# Patient Record
Sex: FEMALE | HISPANIC OR LATINO | Employment: FULL TIME | URBAN - METROPOLITAN AREA
[De-identification: names, ages, dates, MRNs, and addresses within clinical notes are randomized per-mention and may not be internally consistent; named-entity substitution may affect disease eponyms.]

---

## 2017-04-03 ENCOUNTER — TRANSFERRED RECORDS (OUTPATIENT)
Dept: HEALTH INFORMATION MANAGEMENT | Facility: CLINIC | Age: 32
End: 2017-04-03

## 2017-04-14 ENCOUNTER — TRANSFERRED RECORDS (OUTPATIENT)
Dept: HEALTH INFORMATION MANAGEMENT | Facility: CLINIC | Age: 32
End: 2017-04-14

## 2017-04-15 ENCOUNTER — MEDICAL CORRESPONDENCE (OUTPATIENT)
Dept: HEALTH INFORMATION MANAGEMENT | Facility: CLINIC | Age: 32
End: 2017-04-15

## 2017-04-17 ENCOUNTER — MEDICAL CORRESPONDENCE (OUTPATIENT)
Dept: HEALTH INFORMATION MANAGEMENT | Facility: CLINIC | Age: 32
End: 2017-04-17

## 2017-04-20 ENCOUNTER — OFFICE VISIT (OUTPATIENT)
Dept: NEUROLOGY | Facility: CLINIC | Age: 32
End: 2017-04-20

## 2017-04-20 ENCOUNTER — ALLIED HEALTH/NURSE VISIT (OUTPATIENT)
Dept: NEUROLOGY | Facility: CLINIC | Age: 32
End: 2017-04-20

## 2017-04-20 ENCOUNTER — TELEPHONE (OUTPATIENT)
Dept: NEUROLOGY | Facility: CLINIC | Age: 32
End: 2017-04-20

## 2017-04-20 VITALS
BODY MASS INDEX: 21.18 KG/M2 | DIASTOLIC BLOOD PRESSURE: 101 MMHG | SYSTOLIC BLOOD PRESSURE: 164 MMHG | HEIGHT: 66 IN | WEIGHT: 131.8 LBS | HEART RATE: 86 BPM

## 2017-04-20 RX ORDER — NORTRIPTYLINE HCL 10 MG
10 CAPSULE ORAL AT BEDTIME
Qty: 30 CAPSULE | Refills: 3 | Status: SHIPPED | OUTPATIENT
Start: 2017-04-20 | End: 2022-06-13

## 2017-04-20 NOTE — PROGRESS NOTES
[facility] [address]355-18  OP/3hr Video EEG  [facility]  Dr. Moreno reading

## 2017-04-20 NOTE — TELEPHONE ENCOUNTER
Caller: pinky   Relationship to Patient: self   Call Back Number: [phone]   Reason for Call: patient would like to know if she can have caffeine. Patient took herself off of caffeine a week ago       She discontinued caffeine one week ago as she didn't feel well and had a headache.  Pinky calls today to see if it's okay to continue drinking caffeine  Advised Pinky that it would be best to omit caffeine altogether. If she is unable to omit caffeine, would be okay to drink one cup of coffee/day.  Pinky verbalized agreement.

## 2017-04-20 NOTE — LETTER
Date:April 26, 2017      Patient was self referred, no letter generated. Do not send.        [facility] Health Information

## 2017-04-20 NOTE — MR AVS SNAPSHOT
After Visit Summary   4/20/2017    Irasema Kahn    MRN: [id number]           Patient Information     Date Of Birth          1985        Visit Information        Provider Department      4/20/2017 11:30 AM [facility] EEG 1 [facility]        Today's Diagnoses     Spells           Follow-ups after your visit        Your next 10 appointments already scheduled     Apr 25, 2017  1:00 PM CDT   MR BRAIN W/O CONTRAST with UUMR2   [facility], [place], [facility] ([facility], [facility])    [address] [address] [phone]           Take your medicines as usual, unless your doctor tells you not to. Bring a list of your current medicines to your exam (including vitamins, minerals and over-the-counter drugs). Also bring the results of similar scans you may have had.  Please remove any body piercings and hair extensions before you arrive.  Follow your doctor s orders. If you do not, we may have to postpone your exam.  You will not have contrast for this exam. You do not need to do anything special to prepare.  The MRI machine uses a strong magnet. Please wear clothes without metal (snaps, zippers). A sweatsuit works well, or we may give you a hospital gown.   **IMPORTANT** THE INSTRUCTIONS BELOW ARE ONLY FOR THOSE PATIENTS WHO HAVE BEEN TOLD THEY WILL RECEIVE SEDATION OR GENERAL ANESTHESIA DURING THEIR MRI PROCEDURE:  IF YOU WILL RECEIVE SEDATION (take medicine to help you relax during your exam):   You must get the medicine from your doctor before you arrive. Bring the medicine to the exam. Do not take it at home.   Arrive one hour early. Bring someone who can take you home after the test. Your medicine will make you sleepy. After the exam, you may not drive, take a bus or take a taxi by yourself.   No eating 8 hours before your exam. You may have clear liquids up until 4 hours before your exam. (Clear liquids include water, clear tea, black  coffee and fruit juice without pulp.)  IF YOU WILL RECEIVE ANESTHESIA (be asleep for your exam):   Arrive 1 1/2 hours early. Bring someone who can take you home after the test. You may not drive, take a bus or take a taxi by yourself.   No eating 8 hours before your exam. You may have clear liquids up until 4 hours before your exam. (Clear liquids include water, clear tea, black coffee and fruit juice without pulp.)   You will spend four to five hours in the recovery room.  Please call the Imaging Department at your exam site with any questions.            May 30, 2017  3:30 PM CDT   Return Visit with MD NIEVES [facility] Epile[facility] ([facility])    [address], [address]  [facility] [address] [phone]              Future tests that were ordered for you today     Open Future Orders        Priority Expected Expires Ordered    MR Brain w/o Contrast Routine  4/20/2018 4/20/2017            Who to contact     Please call your clinic at [phone] to:    Ask questions about your health    Make or cancel appointments    Discuss your medicines    Learn about your test results    Speak to your doctor   If you have compliments or concerns about an experience at your clinic, or if you wish to file a complaint, please contact [facility] Patient Relations at [phone] or email us at [email or web address]         Additional Information About Your Visit        CorporaharMoxsie Information     Honeyt is an electronic gateway that provides easy, online access to your medical records. With Certain, you can request a clinic appointment, read your test results, renew a prescription or communicate with your care team.     To sign up for Honeyt visit the website at [email or web address]   You will be asked to enter the access code listed below, as well as some personal information. Please follow the directions to create your username and password.      Your access code is: [email or web address]  Expires: 2017 10:30 AM     Your access code will  in 90 days. If you need help or a new code, please contact your [facility] or call [phone] for assistance.        Care EveryWhere ID     This is your Care EveryWhere ID. This could be used by other organizations to access your [place] medical records  [id number]         Blood Pressure from Last 3 Encounters:   17 (!) 164/101   16 136/82    Weight from Last 3 Encounters:   17 131 lb 12.8 oz (59.8 kg)   16 128 lb (58.1 kg)              We Performed the Following     CHARGE: Video EEG  < 12 hours (45320-59)     ORDER:  EEG video monitoring          Today's Medication Changes          These changes are accurate as of: 17  2:47 PM.  If you have any questions, ask your nurse or doctor.               Start taking these medicines.        Dose/Directions    nortriptyline 10 MG capsule   Commonly known as:  PAMELOR   Used for:  Spells   Started by:  Guera Alvarez MD        Dose:  10 mg   Take 1 capsule (10 mg) by mouth At Bedtime Take 1 tab at bedtime   Quantity:  30 capsule   Refills:  3            Where to get your medicines      These medications were sent to [address] [address]     Phone:  [phone]     nortriptyline 10 MG capsule                Primary Care Provider    Physician No Ref-Primary       No address on file        Thank you!     Thank you for choosing [facility] EPILEPSY [facility]  for your care. Our goal is always to provide you with excellent care. Hearing back from our patients is one way we can continue to improve our services. Please take a few minutes to complete the written survey that you may receive in the mail after your visit with us. Thank you!             Your Updated Medication List - Protect others around you: Learn how to safely use, store and  throw away your medicines at www.disposemymeds.org.          This list is accurate as of: 4/20/17  2:47 PM.  Always use your most recent med list.                   Brand Name Dispense Instructions for use    METFORMIN HCL PO      Take 850 mg by mouth daily (with breakfast)       nortriptyline 10 MG capsule    PAMELOR    30 capsule    Take 1 capsule (10 mg) by mouth At Bedtime Take 1 tab at bedtime       UNKNOWN TO PATIENT      Take 1 tablet by mouth daily Birth control

## 2017-04-20 NOTE — LETTER
2017       RE: Irasema Kahn  : 1985   MRN: [id number]      Dear Colleague,    Thank you for referring your patient, Irasema Kahn, to the [facility] EPILEPSY CARE at [facility]. Please see a copy of my visit note below.    INTRODUCTION:  The patient is a 31-year-old, right-handed female who was referred by YUE Pascal CNP, from [facility] for evaluation of visual disturbance and headache.  The patient has a history of seizure disorder.      HISTORY OF PRESENT ILLNESS:  This past Wednesday, , the patient had an episode with seeing spots in her visual fields and a severe headache, which lasted for about 3 days.  The pain was on the top of her head, and also she had significant neck muscle stiffness.  Her headache felt like pressure inside.  The episode of seeing spots lasted for about an hour, but the headache continued for approximately 3-4 days. When she went to [place], she got ibuprofen and Cyclobenzaprine.  Also, a couple months ago she saw flashing lights, which lasted for about 10 minutes.      In  when she was 10 years old, she fell off a chair and hit her head on the floor.  She saw spots in her vision, which may have lasted 30 minutes to an hour; then she passed out and woke up the next day in the hospital.  She had an EEG, which was told it was abnormal, but does not remember exactly what it showed.  She started on valproic acid, a low dose, and was recommended to take no caffeine or stimulating drinks.  She repeated the EEGs every year, which came back normal.  At age 17, she was switched to phenytoin, and 4 years later because she did not have any episodes and her EEG was normal, phenytoin was stopped.  In , she had an episode with loss of memory.  It happened on her graduation day, and she was very excited and anxious on that day.  During that episode, she could not recognize people and could not read, which lasted for an  "hour.  A couple years later she had an episode where half of her visual field in the superior part went blank; she does not know for how long.  She called her mom and said she could not see and then passed out, and she woke up in the hospital.  This happened twice.  Mom witnessed these, and she did not reported any shaking, stiffening or other abnormal involuntary movements.        In most of these situations, she had a high level of stress and anxiety.  Her mom recently passed away shortly after she was diagnosed with pancreatic CA, and she is very emotional and depressed.        RISK FACTORS FOR EPILEPSY:  She thinks she had a febrile seizure when she was close to 1 year old.  She says she had a 40 degree centigrade fever.  She denies a family history of epilepsy, history of meningitis or encephalitis.  She hit her head when she was 10 years old and saw spots and passed out an hour later.        PAST MEDICAL HISTORY:  Insulin resistance.       SOCIAL/EDUCATIONAL HISTORY:  The patient was born and raised in [place] and moved to [place] in 2014.  She is a research assistant in veterinary epidemiology.  She denies smoking.  She drinks 2-4 times a month; on a typical day, she drinks 2-4 drinks.  She denies illicit drugs.  She denies a history of physical, sexual or emotional abuse.      REVIEW OF SYSTEMS:  Complete review of system was done and was positive for blurred vision, sometimes double vision and increased appetite since started on cyclobenzaprine.      PHYSICAL EXAMINATION:   VITAL SIGNS:  BP (!) 164/101 (BP Location: Right arm, Patient Position: Chair, Cuff Size: Adult Regular)  Pulse 86  Ht 5' 6\" (167.6 cm)  Wt 131 lb 12.8 oz (59.8 kg)  Breastfeeding? No  BMI 21.27 kg/m2  GENERAL APPEARANCE:  Alert, awake, cooperative and pleasant, in no apparent distress.      NEUROLOGIC EXAMINATION:     LANGUAGE/SPEECH:  No aphasia or dysarthria.   CRANIAL NERVES:  Pupils are round and reactive to light.  " Extraocular movements are intact.  No nystagmus.  Facial sensation is intact to light touch.  Face is symmetric.  Tongue is midline.  Shrug shoulder is normal.   MOTOR:  Normal tone, bulk and strength 5/5 bilaterally with no pronator drift.     SENSATION:  Intact to light touch bilaterally.   REFLEXES:  DTRs 2+ and symmetric.   COORDINATION:  Normal finger-to-nose.   GAIT:  Gait and tandem gait are steady.      ASSESSMENT:  A 31-year-old, right-handed female with episodes of visual disturbances and headaches.  DDx include visual migraine, seizures or nonepileptic spells.  The patient has also had a couple episodes in the past, which started with vision changes and headache, and she lost consciousness.  In the past, she was treated with antiepileptic medications, which stopped in .  I discussed doing an EEG and brain MRI.  I discussed starting a medication for migraine prophylaxis, which would also help with her depressed mood.  I recommended to start nortriptyline at a low dose.  Because she did not lose consciousness with her last episodes, she is safe to drive, and she understands with any episodes with loss of consciousness, she should avoid driving for 3 months.      PLAN:   1.  Start nortriptyline 10 mg daily.  This may be increased to 20 mg in 3-4 weeks if the patient tolerates it.     2.  A 3 hour video EEG.   3.  Brain MRI with seizure protocol.     4.  Return to clinic in 1 month.         GUERA JONES MD             D: 2017 11:31   T: 2017 12:37   MT: adithya      Name:     NENA PORRAS   MRN:      [id number]        Account:      [id number]   :      1985           Service Date: 2017      Document: [id number]        Again, thank you for allowing me to participate in the care of your patient.      Sincerely,    Guera Jones MD

## 2017-04-20 NOTE — MR AVS SNAPSHOT
After Visit Summary   4/20/2017    Irasema Kahn    MRN: [id number]           Patient Information     Date Of Birth          1985        Visit Information        Provider Department      4/20/2017 9:00 AM [facility]        Today's Diagnoses     Spells    -  1       Follow-ups after your visit        Follow-up notes from your care team     Return in about 4 weeks (around 5/18/2017).      Your next 10 appointments already scheduled     Apr 20, 2017 11:30 AM CDT   EEG with [facility] EEG 1   [facility]    [address], [address]  [facility] [address]   [phone]            Apr 25, 2017  1:00 PM CDT   MR BRAIN W/O CONTRAST with UUMR2   [facility] [place] [place] ([facility] [place])    [address] [facility] [id number] [phone]           Take your medicines as usual, unless your doctor tells you not to. Bring a list of your current medicines to your exam (including vitamins, minerals and over-the-counter drugs). Also bring the results of similar scans you may have had.  Please remove any body piercings and hair extensions before you arrive.  Follow your doctor s orders. If you do not, we may have to postpone your exam.  You will not have contrast for this exam. You do not need to do anything special to prepare.  The MRI machine uses a strong magnet. Please wear clothes without metal (snaps, zippers). A sweatsuit works well, or we may give you a hospital gown.   **IMPORTANT** THE INSTRUCTIONS BELOW ARE ONLY FOR THOSE PATIENTS WHO HAVE BEEN TOLD THEY WILL RECEIVE SEDATION OR GENERAL ANESTHESIA DURING THEIR MRI PROCEDURE:  IF YOU WILL RECEIVE SEDATION (take medicine to help you relax during your exam):   You must get the medicine from your doctor before you arrive. Bring the medicine to the exam. Do not take it at home.   Arrive one hour early. Bring someone who  can take you home after the test. Your medicine will make you sleepy. After the exam, you may not drive, take a bus or take a taxi by yourself.   No eating 8 hours before your exam. You may have clear liquids up until 4 hours before your exam. (Clear liquids include water, clear tea, black coffee and fruit juice without pulp.)  IF YOU WILL RECEIVE ANESTHESIA (be asleep for your exam):   Arrive 1 1/2 hours early. Bring someone who can take you home after the test. You may not drive, take a bus or take a taxi by yourself.   No eating 8 hours before your exam. You may have clear liquids up until 4 hours before your exam. (Clear liquids include water, clear tea, black coffee and fruit juice without pulp.)   You will spend four to five hours in the recovery room.  Please call the Imaging Department at your exam site with any questions.            May 30, 2017  3:30 PM CDT   Return Visit with Guera Alvarez MD   [facility] ([facility])    [address] [place] [address], [address]  [facility] [id number] [phone]              Future tests that were ordered for you today     Open Future Orders        Priority Expected Expires Ordered    ORDER:  EEG video monitoring Routine  7/19/2017 4/20/2017    MR Brain w/o Contrast Routine  4/20/2018 4/20/2017            Who to contact     Please call your clinic at [phone] to:    Ask questions about your health    Make or cancel appointments    Discuss your medicines    Learn about your test results    Speak to your doctor   If you have compliments or concerns about an experience at your clinic, or if you wish to file a complaint, please contact [facility] Patient Relations at [phone] or email us at [email or web address]         Additional Information About Your Visit        People Powerhart Information     Procurify is an electronic gateway that provides easy, online access to your medical records. With Procurify, you can  "request a clinic appointment, read your test results, renew a prescription or communicate with your care team.     To sign up for Ac[employer] visit the website at [email or web address]   You will be asked to enter the access code listed below, as well as some personal information. Please follow the directions to create your username and password.     Your access code is: [email or web address]  Expires: 2017 10:30 AM     Your access code will  in 90 days. If you need help or a new code, please contact your [facility] or call [phone] for assistance.        Care EveryWhere ID     This is your Care EveryWhere ID. This could be used by other organizations to access your [place] medical records  [id number]        Your Vitals Were     Pulse Height Breastfeeding? BMI (Body Mass Index)          86 5' 6\" (167.6 cm) No 21.27 kg/m2         Blood Pressure from Last 3 Encounters:   17 (!) 164/101   16 136/82    Weight from Last 3 Encounters:   17 131 lb 12.8 oz (59.8 kg)   16 128 lb (58.1 kg)                 Today's Medication Changes          These changes are accurate as of: 17 10:30 AM.  If you have any questions, ask your nurse or doctor.               Start taking these medicines.        Dose/Directions    nortriptyline 10 MG capsule   Commonly known as:  PAMELOR   Used for:  Spells   Started by:  Guera Alvarez MD        Dose:  10 mg   Take 1 capsule (10 mg) by mouth At Bedtime Take 1 tab at bedtime   Quantity:  30 capsule   Refills:  3            Where to get your medicines      These medications were sent to [place], [place] - [address]  [address] [address]     Phone:  [phone]     nortriptyline 10 MG capsule                Primary Care Provider    Physician No Ref-Primary       No address on file        Thank you!     Thank you for choosing [facility] EPILEPSY [facility]  for your care. Our " goal is always to provide you with excellent care. Hearing back from our patients is one way we can continue to improve our services. Please take a few minutes to complete the written survey that you may receive in the mail after your visit with us. Thank you!             Your Updated Medication List - Protect others around you: Learn how to safely use, store and throw away your medicines at www.disposemymeds.org.          This list is accurate as of: 4/20/17 10:30 AM.  Always use your most recent med list.                   Brand Name Dispense Instructions for use    METFORMIN HCL PO      Take 850 mg by mouth daily (with breakfast)       nortriptyline 10 MG capsule    PAMELOR    30 capsule    Take 1 capsule (10 mg) by mouth At Bedtime Take 1 tab at bedtime       UNKNOWN TO PATIENT      Take 1 tablet by mouth daily Birth control

## 2017-04-21 ASSESSMENT — PATIENT HEALTH QUESTIONNAIRE - PHQ9: SUM OF ALL RESPONSES TO PHQ QUESTIONS 1-9: 5

## 2017-04-21 NOTE — PROGRESS NOTES
INTRODUCTION:  The patient is a 31-year-old, right-handed female who was referred by YUE Pascal CNP, from [facility] for evaluation of visual disturbance and headache.  The patient has a history of seizure disorder.      HISTORY OF PRESENT ILLNESS:  This past Wednesday, 04/12, the patient had an episode with seeing spots in her visual fields and a severe headache, which lasted for about 3 days.  The pain was on the top of her head, and also she had significant neck muscle stiffness.  Her headache felt like pressure inside.  The episode of seeing spots lasted for about an hour, but the headache continued for approximately 3-4 days. When she went to [place], she got ibuprofen and Cyclobenzaprine.  Also, a couple months ago she saw flashing lights, which lasted for about 10 minutes.      In 1996 when she was 10 years old, she fell off a chair and hit her head on the floor.  She saw spots in her vision, which may have lasted 30 minutes to an hour; then she passed out and woke up the next day in the hospital.  She had an EEG, which was told it was abnormal, but does not remember exactly what it showed.  She started on valproic acid, a low dose, and was recommended to take no caffeine or stimulating drinks.  She repeated the EEGs every year, which came back normal.  At age 17, she was switched to phenytoin, and 4 years later because she did not have any episodes and her EEG was normal, phenytoin was stopped.  In 2009, she had an episode with loss of memory.  It happened on her graduation day, and she was very excited and anxious on that day.  During that episode, she could not recognize people and could not read, which lasted for an hour.  A couple years later she had an episode where half of her visual field in the superior part went blank; she does not know for how long.  She called her mom and said she could not see and then passed out, and she woke up in the hospital.  This happened twice.  Mom  "witnessed these, and she did not reported any shaking, stiffening or other abnormal involuntary movements.        In most of these situations, she had a high level of stress and anxiety.  Her mom recently passed away shortly after she was diagnosed with pancreatic CA, and she is very emotional and depressed.        RISK FACTORS FOR EPILEPSY:  She thinks she had a febrile seizure when she was close to 1 year old.  She says she had a 40 degree centigrade fever.  She denies a family history of epilepsy, history of meningitis or encephalitis.  She hit her head when she was 10 years old and saw spots and passed out an hour later.        PAST MEDICAL HISTORY:  Insulin resistance.       SOCIAL/EDUCATIONAL HISTORY:  The patient was born and raised in [place] and moved to [place] in 2014.  She is a research assistant in veterinary epidemiology.  She denies smoking.  She drinks 2-4 times a month; on a typical day, she drinks 2-4 drinks.  She denies illicit drugs.  She denies a history of physical, sexual or emotional abuse.      REVIEW OF SYSTEMS:  Complete review of system was done and was positive for blurred vision, sometimes double vision and increased appetite since started on cyclobenzaprine.      PHYSICAL EXAMINATION:   VITAL SIGNS:  BP (!) 164/101 (BP Location: Right arm, Patient Position: Chair, Cuff Size: Adult Regular)  Pulse 86  Ht 5' 6\" (167.6 cm)  Wt 131 lb 12.8 oz (59.8 kg)  Breastfeeding? No  BMI 21.27 kg/m2  GENERAL APPEARANCE:  Alert, awake, cooperative and pleasant, in no apparent distress.      NEUROLOGIC EXAMINATION:     LANGUAGE/SPEECH:  No aphasia or dysarthria.   CRANIAL NERVES:  Pupils are round and reactive to light.  Extraocular movements are intact.  No nystagmus.  Facial sensation is intact to light touch.  Face is symmetric.  Tongue is midline.  Shrug shoulder is normal.   MOTOR:  Normal tone, bulk and strength 5/5 bilaterally with no pronator drift.     SENSATION:  Intact to light touch " bilaterally.   REFLEXES:  DTRs 2+ and symmetric.   COORDINATION:  Normal finger-to-nose.   GAIT:  Gait and tandem gait are steady.      ASSESSMENT:  A 31-year-old, right-handed female with episodes of visual disturbances and headaches.  DDx include visual migraine, seizures or nonepileptic spells.  The patient has also had a couple episodes in the past, which started with vision changes and headache, and she lost consciousness.  In the past, she was treated with antiepileptic medications, which stopped in .  I discussed doing an EEG and brain MRI.  I discussed starting a medication for migraine prophylaxis, which would also help with her depressed mood.  I recommended to start nortriptyline at a low dose.  Because she did not lose consciousness with her last episodes, she is safe to drive, and she understands with any episodes with loss of consciousness, she should avoid driving for 3 months.      PLAN:   1.  Start nortriptyline 10 mg daily.  This may be increased to 20 mg in 3-4 weeks if the patient tolerates it.     2.  A 3 hour video EEG.   3.  Brain MRI with seizure protocol.     4.  Return to clinic in 1 month.         GIO JONES MD             D: 2017 11:31   T: 2017 12:37   MT: adithya      Name:     NENA PORRAS   MRN:      [id number]        Account:      [id number]   :      1985           Service Date: 2017      Document: [id number]

## 2017-04-25 ENCOUNTER — HOSPITAL ENCOUNTER (OUTPATIENT)
Dept: MRI IMAGING | Facility: CLINIC | Age: 32
Discharge: HOME | End: 2017-04-25
Attending: PSYCHIATRY & NEUROLOGY | Admitting: PSYCHIATRY & NEUROLOGY
Payer: COMMERCIAL

## 2017-04-25 PROCEDURE — A9585 GADOBUTROL INJECTION: HCPCS | Performed by: PSYCHIATRY & NEUROLOGY

## 2017-04-25 PROCEDURE — 70553 MRI BRAIN STEM W/O & W/DYE: CPT

## 2017-04-25 PROCEDURE — 25500064 ZZH RX 255 OP 636: Performed by: PSYCHIATRY & NEUROLOGY

## 2017-04-25 RX ORDER — GADOBUTROL 604.72 MG/ML
0.1 INJECTION INTRAVENOUS ONCE
Status: COMPLETED | OUTPATIENT
Start: 2017-04-25 | End: 2017-04-25

## 2017-04-25 RX ORDER — GADOBUTROL 604.72 MG/ML
7.5 INJECTION INTRAVENOUS ONCE
Status: DISCONTINUED | OUTPATIENT
Start: 2017-04-25 | End: 2017-04-26 | Stop reason: HOSPADM

## 2017-04-25 RX ADMIN — GADOBUTROL 5 ML: 604.72 INJECTION INTRAVENOUS at 13:54

## 2017-05-17 ENCOUNTER — OFFICE VISIT (OUTPATIENT)
Dept: FAMILY MEDICINE | Facility: CLINIC | Age: 32
End: 2017-05-17

## 2017-05-17 VITALS
HEIGHT: 65 IN | DIASTOLIC BLOOD PRESSURE: 81 MMHG | WEIGHT: 128.2 LBS | SYSTOLIC BLOOD PRESSURE: 127 MMHG | TEMPERATURE: 98.2 F | BODY MASS INDEX: 21.36 KG/M2 | HEART RATE: 73 BPM

## 2017-05-17 DIAGNOSIS — R10.31 RLQ ABDOMINAL PAIN: Primary | ICD-10-CM

## 2017-05-17 LAB
BACTERIA: NORMAL
BILIRUBIN UR: NEGATIVE
BLOOD UR: ABNORMAL
CASTS: NORMAL /LPF
CRYSTAL URINE: NORMAL /LPF
EPITHELIAL CELLS UR: NORMAL /LPF (ref 0–2)
GLUCOSE URINE: NEGATIVE
KETONES UR QL: NEGATIVE
LEUKOCYTE ESTERASE UR: ABNORMAL
MUCOUS URINE: NORMAL LPF
NITRITE UR QL STRIP: NEGATIVE
PH UR STRIP: 6 [PH] (ref 5–7)
PROTEIN UR: NEGATIVE
RBC URINE: NORMAL /HPF
SP GR UR STRIP: <=1.005
UROBILINOGEN UR STRIP-ACNC: ABNORMAL
WBC URINE: NORMAL /HPF

## 2017-05-17 RX ORDER — DESOGESTREL AND ETHINYL ESTRADIOL 0.15-0.03
1 KIT ORAL DAILY
COMMUNITY
End: 2022-06-13

## 2017-05-17 NOTE — MR AVS SNAPSHOT
After Visit Summary   2017    Irasema Kahn    MRN: [id number]           Patient Information     Date Of Birth          1985        Visit Information        Provider Department      2017 2:50 PM Shahla Vega MD [facility]        Today's Diagnoses     RLQ abdominal pain    -  1       Follow-ups after your visit        Your next 10 appointments already scheduled     May 30, 2017  3:30 PM CDT   Return Visit with Guera Alvarez MD   [facility]    [address], [address]  [place] [facility] [facility] [address] [phone]              Future tests that were ordered for you today     Open Future Orders        Priority Expected Expires Ordered    US Pelvic Complete with Transvaginal Routine  2018            Who to contact     Please call your clinic at [phone] to:    Ask questions about your health    Make or cancel appointments    Discuss your medicines    Learn about your test results    Speak to your doctor   If you have compliments or concerns about an experience at your clinic, or if you wish to file a complaint, please contact [facility] Patient Relations at [phone] or email us at [email or web address]         Additional Information About Your Visit        MyChart Information     Cell[employer] is an electronic gateway that provides easy, online access to your medical records. With [employer], you can request a clinic appointment, read your test results, renew a prescription or communicate with your care team.     To sign up for Cell[employer]xt visit the website at [email or web address]   You will be asked to enter the access code listed below, as well as some personal information. Please follow the directions to create your username and password.     Your access code is: [email or web address]  Expires: 8/15/2017  3:21 PM     Your access code will  in 90 days. If you need help or a new  "code, please contact your [facility] or call [phone] for assistance.        Care EveryWhere ID     This is your Care EveryWhere ID. This could be used by other organizations to access your [place] medical records  [id number]        Your Vitals Were     Pulse Temperature Height BMI (Body Mass Index)          73 98.2  F (36.8  C) 5' 5.25\" (165.7 cm) 21.17 kg/m2         Blood Pressure from Last 3 Encounters:   05/17/17 127/81   04/20/17 (!) 164/101   05/06/16 136/82    Weight from Last 3 Encounters:   05/17/17 128 lb 3.2 oz (58.2 kg)   04/20/17 131 lb 12.8 oz (59.8 kg)   05/06/16 128 lb (58.1 kg)              We Performed the Following     Urinalysis, Micro If ([facility])        Primary Care Provider Office Phone # Fax #    Shahla Vega [phone]       [facility] [address] [place] [address]        Thank you!     Thank you for choosing [facility]  for your care. Our goal is always to provide you with excellent care. Hearing back from our patients is one way we can continue to improve our services. Please take a few minutes to complete the written survey that you may receive in the mail after your visit with us. Thank you!             Your Updated Medication List - Protect others around you: Learn how to safely use, store and throw away your medicines at www.disposemymeds.org.          This list is accurate as of: 5/17/17  3:23 PM.  Always use your most recent med list.                   Brand Name Dispense Instructions for use    CYCLOBENZAPRINE HCL PO          desogestrel-ethinyl estradiol 0.15-30 MG-MCG per tablet    APRI     Take 1 tablet by mouth daily       METFORMIN HCL PO      Take 850 mg by mouth daily (with breakfast)       nortriptyline 10 MG capsule    PAMELOR    30 capsule    Take 1 capsule (10 mg) by mouth At Bedtime Take 1 tab at bedtime       UNKNOWN TO PATIENT      Take 1 tablet by mouth daily Birth control         "

## 2017-05-17 NOTE — PATIENT INSTRUCTIONS
[facility]  [address]  [phone]    *Drink about 24 ounces of water one hour prior to your exam.     Appointment  Date:5/18/17  Time:7:45am     Please contact the above clinic if you need to cancel or reschedule. Feel free to contact me with any questions. Thanks!    Desiree  Referral Coordinator  [phone]    Gave to patient.

## 2017-05-18 DIAGNOSIS — R10.31 RLQ ABDOMINAL PAIN: ICD-10-CM

## 2017-05-19 ENCOUNTER — TRANSFERRED RECORDS (OUTPATIENT)
Dept: HEALTH INFORMATION MANAGEMENT | Facility: CLINIC | Age: 32
End: 2017-05-19

## 2017-05-19 ENCOUNTER — TELEPHONE (OUTPATIENT)
Dept: NURSING | Facility: CLINIC | Age: 32
End: 2017-05-19

## 2017-05-20 ENCOUNTER — TRANSFERRED RECORDS (OUTPATIENT)
Dept: HEALTH INFORMATION MANAGEMENT | Facility: CLINIC | Age: 32
End: 2017-05-20

## 2017-05-20 NOTE — TELEPHONE ENCOUNTER
Call Type: Triage Call    Presenting Problem: Pt has pressure and she is shaking and she is  crying and feeling weak.  Triage Note:  Guideline Title: Weakness or Paralysis  Recommended Disposition: See ED Immediately  Original Inclination: Would have called clinic  Override Disposition:  Intended Action: Go to Hospital / ED  Physician Contacted: No  New onset of severe generalized weakness developing over a few days or less ?  YES  Severe breathing problems ? NO  New or worsening signs and symptoms that may indicate shock ? NO  Choking sensation, cannot swallow own saliva with associated drooling and soft  muffled voice ? NO  Generalized weakness or paralysis after snake bite ? NO  Numbness in the groin and saddle area of pelvis AND lower extremity weakness OR  change in bowel or bladder control (loss of control, retention, overflow) ? NO  Chest discomfort associated with shortness of breath, sweating, odd heartbeats or  different heart rate, nausea, vomiting, lightheadedness, or fainting lasting 5 or  more minutes now or within the last hour ? NO  Chest pain spreading to the shoulders, neck, jaw, in one or both arms, stomach or  back lasting 5 or more minutes now or within the last hour. Pain is NOT  associated with taking a deep breath or a productive cough, movement, or touch to  a localized area. ? NO  New muscle weakness after exposure to hot environment ? NO  New weakness AND new onset irregular or rapid more than 120 beats/minute heartbeat  ? NO  Pressure, fullness, squeezing sensation or pain anywhere in the chest lasting 5 or  more minutes now or within the last hour. Pain is NOT associated with taking a  deep breath or a productive cough, movement, or touch to a localized area on the  chest. ? NO  New paralysis (unable to move) or weakness (not due to pain) involving both sides  of body below a specific level ? NO  New numbness, weakness or paralysis involving face, arm or leg, especially on same  side of  body, loss of balance or coordination, confusion or trouble speaking  occurring now or within last 8 hours ? NO  Experienced transient ischemic symptoms occurring more than 8 hours ago, but  within 24 hours, now resolved ? NO  New onset or sudden worsening of altered mental status now or within last 8 hours  ? NO  New or sudden worsening difficulty speaking or swallowing occurring now or within  last 8 hours ? NO  New loss of balance or coordination (purposeful action) causing sudden trouble  walking or sitting upright occurring now or within last 8 hours ? NO  Physician Instructions:  Care Advice: Protect the patient from falling or other harm.  Another adult should drive.  Write down provider's name. List or place the following in a bag for  transport with the patient: current prescription and/or nonprescription  medications  alternative treatments, therapies and medications  and street drugs.  Take sips of clear liquids (such as water, clear fruit juices without pulp,  soda, tea or coffee without dairy or non-dairy creamer, clear broth or  bouillon, oral hydration solution, or plain gelatin, fruit ices/popsicles,  hard candy) as tolerated. Sucking on ice chips is another option.  Call  if having chest pain lasting 5 minutes or more, sudden severe  shortness of breath, loss of consciousness, or signs of shock (such as  unable to stand due to faintness, dizziness, or lightheadedness  new onset of confusion  slow to respond or difficult to awaken  skin is pale, gray, cool, or moist to touch  severe weakness).

## 2017-05-26 NOTE — PROGRESS NOTES
"Subjective   Irasema Kahn is a 31-year-old female who is new to this clinic and presents with complaints of right lower abdominal pain since 2 nights ago.    She says that the pain was present most when she was moving or rolling over to her right side, and it was so bad that it woke her up.  The intensity waxes and wanes since then.  First, she thought it might be a urinary tract infection, because she has had these in the past, so she tried to drink lots of water.  Her urine has since been lighter in color, and the pain is somewhat reduced, but still it is there consistently throughout the whole day.  At its worst, it felt like she was about to pass out.  She says that she has had this in the past, most typically under times of stress.  She is currently a  at the [employer].  She is originally from [place], and moved to [place] 3 years ago for her studies.  She said that she had this similar pain last around Start when she was home in [place].  She says that workup there was unrevealing.  Her last menstrual period finished 5 days ago.  No nausea, vomiting, diarrhea, or constipation.  No fever, chills, dysuria, or hematuria.    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    Social: Non-smoker.    Objective   Vitals: /81  Pulse 73  Temp 98.2  F (36.8  C)  Ht 5' 5.25\" (165.7 cm)  Wt 128 lb 3.2 oz (58.2 kg)  BMI 21.17 kg/m2  General: Pleasant. Young woman. Thin. No distress.  Heart: Regular rate and rhythm. No murmurs, rubs, or gallops.  Lungs: Clear to auscultation bilaterally. No wheezes or crackles. Good air movement.  GI: Abdomen normal to inspection. No ridigidity, distension, or guarding. Normoactive bowel sounds. Tenderness in right lower quadrant just superior to the iliac crest.  No CVA tenderness. No hepato- or splenomegaly.    Assessment & Plan   New patient, RLQ " abdominal pain.  Wide differential, but urinary and reproductive systems seem most likely, as opposed to gastrointestinal.  She has a history of UTIs, and symptoms somewhat improved with increased hydration.  Location-wise, it is more pelvic than abdominal, suggesting possible right ovarian etiology.  No nausea, vomiting, diarrhea, etc to suggest bowel cause.  -- Check urinalysis and micro.  -- Pelvic US.  -- While working this up, may use acetaminophen or NSAIDs PRN pain.  Heat pack may also be helpful.    Return to clinic following completion of this work-up.

## 2017-05-30 ENCOUNTER — OFFICE VISIT (OUTPATIENT)
Dept: NEUROLOGY | Facility: CLINIC | Age: 32
End: 2017-05-30

## 2017-05-30 VITALS
DIASTOLIC BLOOD PRESSURE: 80 MMHG | SYSTOLIC BLOOD PRESSURE: 124 MMHG | BODY MASS INDEX: 21.66 KG/M2 | HEART RATE: 68 BPM | HEIGHT: 65 IN | WEIGHT: 130 LBS

## 2017-05-30 NOTE — LETTER
2017       RE: Irasema Kahn  : 1985   MRN: [id number]      Dear Colleague,    Thank you for referring your patient, Irasema Kahn, to the [facility]PILEPSY [facility]ARE at [facility]. Please see a copy of my visit note below.    [facility] Progress Note      Patient:  Irasema Kahn  :  1985   Age:  31 year old   Today's Office Visit:  2017    History of Present Illness:     The patient did not have any HAs, so she didn't start nortriptyline.  She had pain in her abdomen couple weeks ago, and they did an U/S and UA.  U/A was fine.  She doesn't have U/S results.  She had some sharp pain in her chest last week, and she had difficulty breathing and her BP was 180-110.  Shee started feeling neck contracture, but no HAs.      She had no spells since last visit.  Her EEG showed sharp transients in left temporal lobe, which appeared to be wicket waves.  Her one lead EKG showed BBB.  I recommended to get a 12 lead EKG.      Current Outpatient Prescriptions   Medication Sig Dispense Refill     desogestrel-ethinyl estradiol (APRI) 0.15-30 MG-MCG per tablet Take 1 tablet by mouth daily       CYCLOBENZAPRINE HCL PO Take 5 mg by mouth as needed        METFORMIN HCL PO Take 850 mg by mouth daily (with breakfast)       nortriptyline (PAMELOR) 10 MG capsule Take 1 capsule (10 mg) by mouth At Bedtime Take 1 tab at bedtime (Patient not taking: Reported on 2017) 30 capsule 3     Review of Systems:  Lethargy / Tiredness:  Yes  Nausea / Vomiting:  No  Double Vision:  No  Sleepiness:  Yes  Depression:  No  Anxiety: Yes  Slowed Cognitive Function:  No  Memory Problems:  No  Poor Balance:  No  Dizziness:  No  Appetite Changes:  No  Blurred Vision:  No  Sleep Changes:  No  Behavioral Changes:  No  Skin: negative  Respiratory: positive for shortness of breath- No cough  Cardiovascular: positive for chest pain  Have you experienced a traumatic fall since your last  "visit: NO    Exam:    /80  Pulse 68  Ht 5' 5.24\" (165.7 cm)  Wt 130 lb (59 kg)  Breastfeeding? No  BMI 21.48 kg/m2     Wt Readings from Last 5 Encounters:   05/30/17 130 lb (59 kg)   05/17/17 128 lb 3.2 oz (58.2 kg)   04/20/17 131 lb 12.8 oz (59.8 kg)   05/06/16 128 lb (58.1 kg)     General Appearance: Alert, awake, NAD  Gait:steady  Attention Span:  Normal  Language: no aphasia, speech fluent  Extraocular Movements:  Normal  Coordination:  Normal FNF  Facial Strength:  Normal  Tongue Strength:  Normal  Limb Strength:  5/5 bilaterally  Limb Tone:  Normal    Assessment and Plan:    1. Spells: She did not have any since last visit.  Her EEG did not show any clear epileptiform discharges. Clinically spells were too unusual for seizures.  At this point, I wouldn't recommend starting an antiepileptic medication.  If spells recurred, I recommend an admission to epilepsy monitoring unit for characterization.      2. Headaches: Resolved on their own, without starting nortriptyline.     3. Bundle branch block on 1 lead EKG: I recommended to get a 12 lead EKG with her PCP to clarify whether it's left BBB or right.     - Return to clinic PRN        As described above, I met with the patient for 20 minutes and during this time counseling was greater than 50% of the visit time.    Guera Alvarez MD                      "

## 2017-05-30 NOTE — MR AVS SNAPSHOT
"              After Visit Summary   5/30/2017    Irasema Kahn    MRN: [id number]           Patient Information     Date Of Birth          1985        Visit Information        Provider Department      5/30/2017 3:30 PM Guera Alvarez MD [facility]         Follow-ups after your visit        Follow-up notes from your care team     Return if symptoms worsen or fail to improve.      Who to contact     Please call your clinic at [phone] to:    Ask questions about your health    Make or cancel appointments    Discuss your medicines    Learn about your test results    Speak to your doctor   If you have compliments or concerns about an experience at your clinic, or if you wish to file a complaint, please contact [facility] Patient Relations at [phone] or email us at [email or web address]         Additional Information About Your Visit        MyChart Information     [employer] gives you secure access to your electronic health record. If you see a primary care provider, you can also send messages to your care team and make appointments. If you have questions, please call your primary care clinic.  If you do not have a primary care provider, please call [phone] and they will assist you.      [employer] is an electronic gateway that provides easy, online access to your medical records. With [employer], you can request a clinic appointment, read your test results, renew a prescription or communicate with your care team.     To access your existing account, please contact your [facility] or call [phone] for assistance.        Care EveryWhere ID     This is your Care EveryWhere ID. This could be used by other organizations to access your [place] medical records  [id number]        Your Vitals Were     Pulse Height Breastfeeding? BMI (Body Mass Index)          68 5' 5.24\" (165.7 cm) No 21.48 kg/m2         Blood Pressure from " Last 3 Encounters:   05/30/17 124/80   05/17/17 127/81   04/20/17 (!) 164/101    Weight from Last 3 Encounters:   05/30/17 130 lb (59 kg)   05/17/17 128 lb 3.2 oz (58.2 kg)   04/20/17 131 lb 12.8 oz (59.8 kg)              Today, you had the following     No orders found for display       Primary Care Provider Office Phone # Fax #    Shahla JILLIAN Vega [phone]       [facility] [address] [place] [address]        Thank you!     Thank you for choosing [facility]LE[facility]  for your care. Our goal is always to provide you with excellent care. Hearing back from our patients is one way we can continue to improve our services. Please take a few minutes to complete the written survey that you may receive in the mail after your visit with us. Thank you!             Your Updated Medication List - Protect others around you: Learn how to safely use, store and throw away your medicines at www.disposemymeds.org.          This list is accurate as of: 5/30/17  4:17 PM.  Always use your most recent med list.                   Brand Name Dispense Instructions for use    CYCLOBENZAPRINE HCL PO      Take 5 mg by mouth as needed       desogestrel-ethinyl estradiol 0.15-30 MG-MCG per tablet    APRI     Take 1 tablet by mouth daily       METFORMIN HCL PO      Take 850 mg by mouth daily (with breakfast)       nortriptyline 10 MG capsule    PAMELOR    30 capsule    Take 1 capsule (10 mg) by mouth At Bedtime Take 1 tab at bedtime

## 2017-05-30 NOTE — PROGRESS NOTES
"[facility] Progress Note      Patient:  Irasema Kahn  :  1985   Age:  31 year old   Today's Office Visit:  2017    History of Present Illness:     The patient did not have any HAs, so she didn't start nortriptyline.  She had pain in her abdomen couple weeks ago, and they did an U/S and UA.  U/A was fine.  She doesn't have U/S results.  She had some sharp pain in her chest last week, and she had difficulty breathing and her BP was 180-110.  Shee started feeling neck contracture, but no HAs.      She had no spells since last visit.  Her EEG showed sharp transients in left temporal lobe, which appeared to be wicket waves.  Her one lead EKG showed BBB.  I recommended to get a 12 lead EKG.      Current Outpatient Prescriptions   Medication Sig Dispense Refill     desogestrel-ethinyl estradiol (APRI) 0.15-30 MG-MCG per tablet Take 1 tablet by mouth daily       CYCLOBENZAPRINE HCL PO Take 5 mg by mouth as needed        METFORMIN HCL PO Take 850 mg by mouth daily (with breakfast)       nortriptyline (PAMELOR) 10 MG capsule Take 1 capsule (10 mg) by mouth At Bedtime Take 1 tab at bedtime (Patient not taking: Reported on 2017) 30 capsule 3     Review of Systems:  Lethargy / Tiredness:  Yes  Nausea / Vomiting:  No  Double Vision:  No  Sleepiness:  Yes  Depression:  No  Anxiety: Yes  Slowed Cognitive Function:  No  Memory Problems:  No  Poor Balance:  No  Dizziness:  No  Appetite Changes:  No  Blurred Vision:  No  Sleep Changes:  No  Behavioral Changes:  No  Skin: negative  Respiratory: positive for shortness of breath- No cough  Cardiovascular: positive for chest pain  Have you experienced a traumatic fall since your last visit: NO    Exam:    /80  Pulse 68  Ht 5' 5.24\" (165.7 cm)  Wt 130 lb (59 kg)  Breastfeeding? No  BMI 21.48 kg/m2     Wt Readings from Last 5 Encounters:   17 130 lb (59 kg)   17 128 lb 3.2 oz (58.2 kg)   17 131 lb 12.8 oz (59.8 kg)   16 " 128 lb (58.1 kg)     General Appearance: Alert, awake, NAD  Gait:steady  Attention Span:  Normal  Language: no aphasia, speech fluent  Extraocular Movements:  Normal  Coordination:  Normal FNF  Facial Strength:  Normal  Tongue Strength:  Normal  Limb Strength:  5/5 bilaterally  Limb Tone:  Normal    Assessment and Plan:    1. Spells: She did not have any since last visit.  Her EEG did not show any clear epileptiform discharges. Clinically spells were too unusual for seizures.  At this point, I wouldn't recommend starting an antiepileptic medication.  If spells recurred, I recommend an admission to epilepsy monitoring unit for characterization.      2. Headaches: Resolved on their own, without starting nortriptyline.     3. Bundle branch block on 1 lead EKG: I recommended to get a 12 lead EKG with her PCP to clarify whether it's left BBB or right.     - Return to clinic PRN        As described above, I met with the patient for 20 minutes and during this time counseling was greater than 50% of the visit time.    Guera Alvarez MD

## 2017-05-31 NOTE — PROCEDURES
EEG #:  [id number]      DATE OF STUDY:  04/20/2017      SOURCE FILE DURATION:  3 hours 5 minutes 59 seconds      CLINICAL SUMMARY:  The patient is a 31-year-old female with spells of vision changes.  EEG was performed to evaluate for seizures.  The patient is on no antiepileptic medications.     TECHNICAL SUMMARY: This continuous video- EEG monitoring procedure was performed with 23 scalp electrodes in 10-20 electrode system placements, and additional scalp, precordial and other surface electrodes used for electrical referencing and artifact detection.  Video monitoring was utilized and periodically reviewed by EEG technologists and the physician for electroclinical correlations.     INTERICTAL ACTIVITY:  During maximal wakefulness, there was 9 Hz alpha activity over the posterior head regions which was symmetric and attenuated by eye opening.  Drowsiness was manifested as dropout of posterior dominant rhythm and diffuse theta activity and horizontal eye movements.  Stage II sleep was manifested as vertex waves, symmetric sleep spindles and K complexes.  During all states of waking, drowsiness and sleep, there was generalized sharply contoured delta activity which was seen intermittently, lasting 1-2 seconds.  These at times had a spiky component, but they did not appear to be epileptogenic discharges.  There were also sharp transients over the left temporal head region which appeared to be wicket waves.  No clear epileptiform discharges were present.      ACTIVATING MANEUVERS:  Photic stimulation induced photic driving response in some of the flash frequencies.  Hyperventilation induced generalized moderate to high amplitude delta slowing.      CLINICAL/ICTAL EVENTS:  No electrographic or clinical seizures were recorded.      EKG:  One-lead EKG showed bundle branch block.      IMPRESSION:  This is an abnormal video EEG due to the presence of intermittent bursts of generalized delta activity with intermixed spikes.   These did not seem like typical generalized spike-wave discharges, and appeared to be an encephalopathic feature; though if there is a high suspicion for epilepsy, a prolonged video EEG monitoring would be helpful for clarification.  Sharp transients over the left temporal head region appeared to be wicket waves.  No electrographic or clinical seizures were recorded.  Clinical correlation advised.          GIO JONES MD             D: 2017 11:06   T: 2017 11:29   MT: al      Name:     NENA PORRAS   MRN:      [id number]        Account:        [id number]   :      1985           Procedure Date: 2017      Document: [id number]

## 2017-06-01 ENCOUNTER — OFFICE VISIT (OUTPATIENT)
Dept: FAMILY MEDICINE | Facility: CLINIC | Age: 32
End: 2017-06-01

## 2017-06-01 VITALS
BODY MASS INDEX: 21.28 KG/M2 | TEMPERATURE: 98.3 F | WEIGHT: 128.8 LBS | DIASTOLIC BLOOD PRESSURE: 85 MMHG | SYSTOLIC BLOOD PRESSURE: 138 MMHG | HEART RATE: 73 BPM

## 2017-06-01 DIAGNOSIS — I10 ESSENTIAL HYPERTENSION: Primary | ICD-10-CM

## 2017-06-01 PROBLEM — G43.909 MIGRAINE: Status: ACTIVE | Noted: 2017-06-01

## 2017-06-01 LAB — TSH SERPL DL<=0.05 MIU/L-ACNC: 1.43 UIU/ML (ref 0.3–5)

## 2017-06-01 NOTE — PROGRESS NOTES
SUBJECTIVE       Irasema Kahn is a 31 year old  female with a PMH significant for:  Patient Active Problem List   Diagnosis     Migraine     Hypertension     She presents for follow-up of abdominal pain.    Abdominal Pain  Vaginal Bleeding versus Discharge  In the interim, Irasema's pain has resolved but with curious time course. Her right lower abdominal versus pelvic pain resolved over two days after her last office visit, however around a week later she woke up to new onset left-sided pain in a symmetric location. He pain resolved after the onset of vaginal discharge. The discharge was a brown/black fluid with sand-like grit, lasted for one and a half days, and was of sufficient quantity to require wearing pads. No vaginal pain or itch, and no discharge or pain since this episode. Two days prior to this, she missed one oral contraceptive pill and took two on the following day.    Hypertension  Chest Pain  On 5/19/17, Irasema presented to [facility] ED with 45 minutes of severe chest pain with tight neck muscles. Work-up was negative, and she was discharged. At this time, BP was 177/106 and fell to 143/89 over the time of service. No headache, diaphoresis, flushing, GI upset, tachycardia, hyperthermia, or anxiety out of proportion to symptoms. Since then, she has measured her BP at home with systolic pressures around or above 140. She has an extensive family history of hypertension and cardiovascular disease. She has tried to keep a low salt diet and to exercise, although she has not worked out since the ED visit. She is a  and feels significant stress from her studies. She used to drink a fair amount of alcohol, but quit drinking since the ED visit because one beer caused her to have a relapse of chest pain and feelings of discomfort.    At this time she has no headache or constant chest pain. She will have random pinpoint pain at various parts of her chest up to 4 times  per day, each episode lasting up to 10 minutes. These painful regions are non-tender. She also has postprandial epigastric pain with the feeling of reflux, and has recently started a PPI- this pain is also worse with deep breathing.      PMH, Medications and Allergies were reviewed and updated as needed.    ROS negative except as mentioned as above        OBJECTIVE     Vitals:    06/01/17 0948   BP: 138/85   Pulse: 73   Temp: 98.3  F (36.8  C)   TempSrc: Oral   Weight: 128 lb 12.8 oz (58.4 kg)     Body mass index is 21.28 kg/(m^2).    General: Alert, appropriate, and cooperative.  Appears stated age.  In no acute distress.  HEENT:  Atraumatic. No exophthalmos. Pupils equal, conjunctiva clear. Mouth shows moist mucous membranes.  CV:  Regular rhythm and rate.  No murmurs, rubs, or gallops. One episode of well-localized, non-tender chest pain around the third right midcostal line during interview, with no overt erythema, lesion, or muscle spasm.  Lungs:  Clear to auscultation bilaterally.  No wheezes, rhonchi, or rales.  Abd:  Soft, non-distended, non-tender.  Ext: No cyanosis or edema.  Skin: No obvious rashes, jaundice, or suspicious lesions.    Outside labs and studies from 5/19 reviewed: Glucose 106, BMP otherwise within normal limits. CBC notable for absolute lymphocytosis (4290) with WBC 9.0. EKG read as normal sinus rhythm, CXR unremarkable.      ASSESSMENT AND PLAN     Abdominal Pain and Vaginal Discharge  Symptoms have resolved, and transvaginal pelvic ultrasound was negative and urinalysis was largely unremarkable. Pelvic exam was deferred today- we will discuss routine female health exam and screening during follow-up wellness visits. Further work-up will be deferred unless symptoms resume. She does not exhibit characteristic symptoms of pelvic infection, and would likely require abdominal CT to rule out masses or lesions not seen on the ultrasound.    Hypertension  Young, thin, otherwise healthy female.  Although there is an extensive family history, secondary causes of hypertension must be explored. Her brief episode of chest pain and acute-on-chronic hypertension may suggest pheochromocytoma, but she lacked several other expected symptoms such as tachycardia. We will begin with a broad screen for pheo, hyperaldosteronism, renal disease, and hyperthyroidism. Low suspicion for aortic coarctation given normal CXR.    We discussed lifestyle modifications she might make in the meantime. She does not drink or smoke, and is not overweight. We recommend she resume exercise as tolerated, and provided information about the DASH diet.  - Thyroid cascade  - Renin and aldosterone  - Plasma metanephrines  - Bilateral renal ultrasound with doppler    GERD  Epigastric, post-prandial pain is likely due to reflux, but is complicating her overall clinical picture. We did not discuss this issue in detail. She is self-medicating with PPI - we will follow-up these symptoms during future visits, likely counseling a switch to H2 blockers pending resolution of symptoms.      We will contact Irasema with recommendations for follow-up pending laboratory and imaging studies. She should return to clinic for any new or worsening symptoms.      Scribe Disclosure:   I, Dominic Cancino, am serving as a scribe; to document services personally performed by Dr. Shahla Vega- -based on data collection and the provider's statements to me.     Provider Disclosure:  I agree with above History, Review of Systems, Physical exam and Plan.  I have reviewed the content of the documentation and have edited it as needed. I have personally performed the services documented here and the documentation accurately represents those services and the decisions I have made.

## 2017-06-01 NOTE — PATIENT INSTRUCTIONS
Dietary Approaches to Stop Hypertension (The DASH Diet)   What is hypertension?   Hypertension is blood pressure that keeps being higher than normal. Blood pressure is the force of blood against artery walls as the heart pumps blood through the body. Blood pressure can be unhealthy if it is above 120/80. The higher your blood pressure, the greater the health risk.   High blood pressure can be controlled if you take these steps:   Maintain a healthy weight.   Are physically active.   Follow a healthy eating plan, which includes foods that do not have a lot of salt and sodium.   Do not drink a lot of alcohol.   Diet affects high blood pressure. Following the DASH diet and reducing the amount of sodium in your diet will help lower your blood pressure. It will also help prevent high blood pressure.   What is the DASH diet?   Dietary Approaches to Stop Hypertension (DASH) is a diet that is low in saturated fat, cholesterol, and total fat. It emphasizes fruits, vegetables, and low-fat dairy foods. The DASH diet also includes whole-grain products, fish, poultry, and nuts. It encourages fewer servings of red meat, sweets, and sugar-containing beverages. It is rich in magnesium, potassium, and calcium, as well as protein and fiber.   How do I get started on the DASH diet?   The DASH diet requires no special foods and has no hard-to-follow recipes. Start by seeing how DASH compares with your current eating habits.   The DASH eating plan illustrated below is based on a diet of 2,000 calories a day. Your healthcare provider or a dietitian can help you determine how many calories a day you need. Most adults need somewhere between 1600 and 2800 calories a day. Serving sizes for different foods vary from 1/2 cup to 1 and 1/4 cups. Check product nutrition labels for serving sizes and the number of calories per serving.                      Number of        Examples of  Food Group      servings          serving size  ----------------------------------------------------------------    Grains and      7 to 8 per day   1 slice of bread  grain products                   1 cup ready-to-eat cold cereal                                   1/2 cup cooked rice, pasta,                                   or cereal    Vegetables      4 to 5 per day   1 cup raw leafy vegetable                                   1/2 cup cooked vegetable                                   6 ounces (oz) vegetable juice      Fruits          4 to 5 per day   1 medium fruit                                   1/4 cup dried fruit                                   1/2 cup fresh, frozen, or                                   canned fruit                                   6 oz fruit juice      Low-fat or      2 to 3 per day   8 oz milk  fat-free                         1 cup yogurt  dairy foods                      1 and 1/2 oz cheese    Lean meats,  poultry,        2 or fewer per   3 oz cooked lean meat,  or fish         day              skinless poultry, or fish    Nuts, seeds,    4 to 5 per week  1/3 cup or 1 and 1/2 oz nuts  and dry beans                    1 tablespoon or 1/2 oz seeds                                   1/2 cup cooked dry beans    Fats and oils   2 to 3 per day   1 teaspoon soft margarine                                   1 tablespoon low-fat mayonnaise                                   2 tablespoons light salad                                   dressing                                   1 teaspoon vegetable oil    Sweets          5 per week       1 tablespoon sugar                                   1 tablespoon jelly or jam                                   1/2 oz jelly beans                                   8 oz lemonade  ----------------------------------------------------------------  Make changes gradually. Here are some suggestions that might help:   If you now eat 1 or 2 servings of vegetables a day, add a serving at lunch and another  at dinner.   If you have not been eating fruit regularly, or have only juice at breakfast, add a serving to your meals or have it as a snack.   Drink milk or water with lunch or dinner instead of soda, sugar-sweetened tea, or alcohol. Choose low-fat (1%) or fat-free (nonfat) dairy products so that you are eating fewer calories and less saturated fat, total fat, and cholesterol.   Read food labels on margarines and salad dressings to choose products lowest in fat.   If you now eat large portions of meat, slowly cut back--by a half or a third at each meal. Limit meat to 6 ounces a day (two 3-ounce servings). Three to 4 ounces is about the size of a deck of cards.   Have 2 or more meatless meals each week. Increase servings of vegetables, rice, pasta, and beans in all meals. Try casseroles, pasta, and stir-matthews dishes, which have less meat and more vegetables, grains, and beans.   Use fruits canned in their own juice. Fresh fruits require little or no preparation. Dried fruits are a good choice to carry with you or to have ready in the car.   Try these snacks ideas: unsalted pretzels or nuts mixed with raisins, rony crackers, low-fat and fat-free yogurt or frozen yogurt, popcorn with no salt or butter added, and raw vegetables.   Choose whole-grain foods to get more nutrients, including minerals and fiber. For example, choose whole-wheat bread, whole-grain cereals, or brown rice.   Use fresh, frozen, or no-salt-added canned vegetables.   Remember to also reduce the salt and sodium in your diet. Try to have no more than 2000 milligrams (mg) of sodium per day, with a goal of further reducing the sodium to 1500 mg per day. Three important ways to reduce sodium are:   Eat food products with reduced-sodium or no salt added.   Use less salt when you prepare foods and do not add salt to your food at the table.   Read food labels. Aim for foods that contain less than 5% of the daily value of sodium.   The DASH eating plan is  not designed for weight loss. But it contains many lower-calorie foods, such as fruits and vegetables. You can make it lower in calories by replacing high-calorie foods with more fruits and vegetables. Some ideas to increase fruits and vegetables and decrease calories include:   Eat a medium apple instead of 4 shortbread cookies. You'll save 80 calories.   Eat 1/4 cup of dried apricots instead of a 2-ounce bag of pork rinds. You'll save 230 calories.   Have a hamburger that's 3 ounces instead of 6 ounces. Add a 1/2 cup serving of carrots and a 1/2 cup serving of spinach. You'll save more than 200 calories.   Instead of 5 ounces of chicken, have a stir matthews with 2 ounces of chicken and 1 and 1/2 cups of raw vegetables. Use just a small amount of vegetable oil. You'll save 50 calories.   Have a 1/2 cup serving of low-fat frozen yogurt instead of a 1-and-1/2-ounce chocolate bar. You'll save about 110 calories.   Use low-fat or fat-free condiments, such as fat-free salad dressings.   Eat smaller portions. Cut back gradually.   Use food labels to compare fat and calorie content in packaged foods. Items marked low fat or fat free may be lower in fat but not lower in calories than their regular versions.   Limit foods with lots of added sugar, such as pies, flavored yogurts, candy bars, ice cream, sherbet, regular soft drinks, and fruit drinks.   Drink water or club soda instead of cola or other soda drinks.     For more information, see the National Heart, Lung, and Blood [place] Web site at: http://www.nhlbi.nih.gov/health/public/heart/hbp/dash/.     Please Stop down lab before leaving today.        [facility]  [address] [place] [address]  [phone]    Appointment  Date: 6/7/17  Time: 11:00am arrival    Please contact the above clinic if you need to cancel or reschedule. Feel free to contact me with any questions. Thanks!    Desiree  Referral Coordinator  [phone]    Called and gave patient  information over the phone.

## 2017-06-01 NOTE — MR AVS SNAPSHOT
After Visit Summary   6/1/2017    Irasema Kahn    MRN: [id number]           Patient Information     Date Of Birth          1985        Visit Information        Provider Department      6/1/2017 9:40 AM Shahla Vega MD [facility]        Today's Diagnoses     Essential hypertension    -  1      Care Instructions                             Dietary Approaches to Stop Hypertension (The DASH Diet)   What is hypertension?   Hypertension is blood pressure that keeps being higher than normal. Blood pressure is the force of blood against artery walls as the heart pumps blood through the body. Blood pressure can be unhealthy if it is above 120/80. The higher your blood pressure, the greater the health risk.   High blood pressure can be controlled if you take these steps:   Maintain a healthy weight.   Are physically active.   Follow a healthy eating plan, which includes foods that do not have a lot of salt and sodium.   Do not drink a lot of alcohol.   Diet affects high blood pressure. Following the DASH diet and reducing the amount of sodium in your diet will help lower your blood pressure. It will also help prevent high blood pressure.   What is the DASH diet?   Dietary Approaches to Stop Hypertension (DASH) is a diet that is low in saturated fat, cholesterol, and total fat. It emphasizes fruits, vegetables, and low-fat dairy foods. The DASH diet also includes whole-grain products, fish, poultry, and nuts. It encourages fewer servings of red meat, sweets, and sugar-containing beverages. It is rich in magnesium, potassium, and calcium, as well as protein and fiber.   How do I get started on the DASH diet?   The DASH diet requires no special foods and has no hard-to-follow recipes. Start by seeing how DASH compares with your current eating habits.   The DASH eating plan illustrated below is based on a diet of 2,000 calories a day. Your healthcare provider or a dietitian can help you determine  how many calories a day you need. Most adults need somewhere between 1600 and 2800 calories a day. Serving sizes for different foods vary from 1/2 cup to 1 and 1/4 cups. Check product nutrition labels for serving sizes and the number of calories per serving.                      Number of        Examples of  Food Group      servings         serving size  ----------------------------------------------------------------    Grains and      7 to 8 per day   1 slice of bread  grain products                   1 cup ready-to-eat cold cereal                                   1/2 cup cooked rice, pasta,                                   or cereal    Vegetables      4 to 5 per day   1 cup raw leafy vegetable                                   1/2 cup cooked vegetable                                   6 ounces (oz) vegetable juice      Fruits          4 to 5 per day   1 medium fruit                                   1/4 cup dried fruit                                   1/2 cup fresh, frozen, or                                   canned fruit                                   6 oz fruit juice      Low-fat or      2 to 3 per day   8 oz milk  fat-free                         1 cup yogurt  dairy foods                      1 and 1/2 oz cheese    Lean meats,  poultry,        2 or fewer per   3 oz cooked lean meat,  or fish         day              skinless poultry, or fish    Nuts, seeds,    4 to 5 per week  1/3 cup or 1 and 1/2 oz nuts  and dry beans                    1 tablespoon or 1/2 oz seeds                                   1/2 cup cooked dry beans    Fats and oils   2 to 3 per day   1 teaspoon soft margarine                                   1 tablespoon low-fat mayonnaise                                   2 tablespoons light salad                                   dressing                                   1 teaspoon vegetable oil    Sweets          5 per week       1 tablespoon sugar                                   1  tablespoon jelly or jam                                   1/2 oz jelly beans                                   8 oz lemonade  ----------------------------------------------------------------  Make changes gradually. Here are some suggestions that might help:   If you now eat 1 or 2 servings of vegetables a day, add a serving at lunch and another at dinner.   If you have not been eating fruit regularly, or have only juice at breakfast, add a serving to your meals or have it as a snack.   Drink milk or water with lunch or dinner instead of soda, sugar-sweetened tea, or alcohol. Choose low-fat (1%) or fat-free (nonfat) dairy products so that you are eating fewer calories and less saturated fat, total fat, and cholesterol.   Read food labels on margarines and salad dressings to choose products lowest in fat.   If you now eat large portions of meat, slowly cut back--by a half or a third at each meal. Limit meat to 6 ounces a day (two 3-ounce servings). Three to 4 ounces is about the size of a deck of cards.   Have 2 or more meatless meals each week. Increase servings of vegetables, rice, pasta, and beans in all meals. Try casseroles, pasta, and stir-matthews dishes, which have less meat and more vegetables, grains, and beans.   Use fruits canned in their own juice. Fresh fruits require little or no preparation. Dried fruits are a good choice to carry with you or to have ready in the car.   Try these snacks ideas: unsalted pretzels or nuts mixed with raisins, rony crackers, low-fat and fat-free yogurt or frozen yogurt, popcorn with no salt or butter added, and raw vegetables.   Choose whole-grain foods to get more nutrients, including minerals and fiber. For example, choose whole-wheat bread, whole-grain cereals, or brown rice.   Use fresh, frozen, or no-salt-added canned vegetables.   Remember to also reduce the salt and sodium in your diet. Try to have no more than 2000 milligrams (mg) of sodium per day, with a goal of  further reducing the sodium to 1500 mg per day. Three important ways to reduce sodium are:   Eat food products with reduced-sodium or no salt added.   Use less salt when you prepare foods and do not add salt to your food at the table.   Read food labels. Aim for foods that contain less than 5% of the daily value of sodium.   The DASH eating plan is not designed for weight loss. But it contains many lower-calorie foods, such as fruits and vegetables. You can make it lower in calories by replacing high-calorie foods with more fruits and vegetables. Some ideas to increase fruits and vegetables and decrease calories include:   Eat a medium apple instead of 4 shortbread cookies. You'll save 80 calories.   Eat 1/4 cup of dried apricots instead of a 2-ounce bag of pork rinds. You'll save 230 calories.   Have a hamburger that's 3 ounces instead of 6 ounces. Add a 1/2 cup serving of carrots and a 1/2 cup serving of spinach. You'll save more than 200 calories.   Instead of 5 ounces of chicken, have a stir matthews with 2 ounces of chicken and 1 and 1/2 cups of raw vegetables. Use just a small amount of vegetable oil. You'll save 50 calories.   Have a 1/2 cup serving of low-fat frozen yogurt instead of a 1-and-1/2-ounce chocolate bar. You'll save about 110 calories.   Use low-fat or fat-free condiments, such as fat-free salad dressings.   Eat smaller portions. Cut back gradually.   Use food labels to compare fat and calorie content in packaged foods. Items marked low fat or fat free may be lower in fat but not lower in calories than their regular versions.   Limit foods with lots of added sugar, such as pies, flavored yogurts, candy bars, ice cream, sherbet, regular soft drinks, and fruit drinks.   Drink water or club soda instead of cola or other soda drinks.     For more information, see the National Heart, Lung, and Blood [place] Web site at: http://www.nhlbi.nih.gov/health/public/heart/hbp/dash/.     Please Stop down lab  before leaving today.              Follow-ups after your visit        Future tests that were ordered for you today     Open Future Orders        Priority Expected Expires Ordered    US Renal Complete w Doppler Complete Routine  6/1/2018 6/1/2017            Who to contact     Please call your clinic at [phone] to:    Ask questions about your health    Make or cancel appointments    Discuss your medicines    Learn about your test results    Speak to your doctor   If you have compliments or concerns about an experience at your clinic, or if you wish to file a complaint, please contact [facility] Patient Relations at [phone] or email us at [email or web address]         Additional Information About Your Visit        edPULSEharPHmHealth Information     [employer] gives you secure access to your electronic health record. If you see a primary care provider, you can also send messages to your care team and make appointments. If you have questions, please call your primary care clinic.  If you do not have a primary care provider, please call [phone] and they will assist you.      [employer] is an electronic gateway that provides easy, online access to your medical records. With [employer], you can request a clinic appointment, read your test results, renew a prescription or communicate with your care team.     To access your existing account, please contact your [facility] or call [phone] for assistance.        Care EveryWhere ID     This is your Care EveryWhere ID. This could be used by other organizations to access your [place] medical records  [id number]        Your Vitals Were     Pulse Temperature Last Period BMI (Body Mass Index)          73 98.3  F (36.8  C) (Oral) 05/11/2017 21.28 kg/m2         Blood Pressure from Last 3 Encounters:   06/01/17 138/85   05/30/17 124/80   05/17/17 127/81    Weight from Last 3 Encounters:   06/01/17 128 lb 12.8 oz  (58.4 kg)   05/30/17 130 lb (59 kg)   05/17/17 128 lb 3.2 oz (58.2 kg)              We Performed the Following     Aldosterone Serum ([facility])     EKG 12-lead complete w/read - Clinics     Metanephrines  Pl ([facility])     Renin Activity ([facility])     Thyroid [place] ([facility])        Primary Care Provider Office Phone # Fax #    Shahla JILLIAN Vega [phone]       [facility] [address] [place] [address]        Thank you!     Thank you for choosing [facility]  for your care. Our goal is always to provide you with excellent care. Hearing back from our patients is one way we can continue to improve our services. Please take a few minutes to complete the written survey that you may receive in the mail after your visit with us. Thank you!             Your Updated Medication List - Protect others around you: Learn how to safely use, store and throw away your medicines at www.disposemymeds.org.          This list is accurate as of: 6/1/17 10:46 AM.  Always use your most recent med list.                   Brand Name Dispense Instructions for use    CYCLOBENZAPRINE HCL PO      Take 5 mg by mouth as needed       desogestrel-ethinyl estradiol 0.15-30 MG-MCG per tablet    APRI     Take 1 tablet by mouth daily       METFORMIN HCL PO      Take 850 mg by mouth daily (with breakfast)       nortriptyline 10 MG capsule    PAMELOR    30 capsule    Take 1 capsule (10 mg) by mouth At Bedtime Take 1 tab at bedtime

## 2017-06-03 LAB — RENIN PLAS-CCNC: 1.5 NG/ML/H

## 2017-06-05 LAB
METANEPHRINE, FREE: <0.2 NMOL/L
NORMETANEPHRINE, FREE: 0.23 NMOL/L

## 2017-06-06 LAB — ALDOST SERPL-MCNC: 5.2 NG/DL

## 2017-06-08 DIAGNOSIS — I10 ESSENTIAL HYPERTENSION: ICD-10-CM

## 2017-06-09 NOTE — PROGRESS NOTES
The medical student acted as a scribe and the encounter documented above was performed completely by me and the documentation accurately reflects the work I have performed today.  Shahla Vega MD

## 2017-06-13 ENCOUNTER — DOCUMENTATION ONLY (OUTPATIENT)
Dept: PSYCHOLOGY | Facility: CLINIC | Age: 32
End: 2017-06-13

## 2017-06-13 NOTE — PROGRESS NOTES
Behavioral Health Team,    Patient is being referred for mental health services. I spoke with Dr. Vega and patient would like 1-2 years of therapy. If unable to do in house we could provide her with some community resources near [facility] where she attends school.     Thank you.     Desiree  Referral Coordinator

## 2017-06-13 NOTE — PROGRESS NOTES
Carlyle Ramírez - Talked with Dr. Vega about this referral today.  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  Not clear that she will need long term therapy - may benefit from short term help in learning skills to target stress/anxiety.  Could potentially be a good candidate for brief work with Dr. Roper prior to her graduation in September.  First open MH slot is next Friday (6/23).  Could offer this to patient, but if she is uncertain about short term nature of work, we can also provide resources for therapy in the community (see below).  Let me know if you have questions or would like additional follow up from me.  Thanks!  Maggi Gomez, Ph.D.,     [facility]  [address] [place] [address] [address] [place] [address]  [phone]    [facility]  [address] [place] [address]  [address]  [place], [place] [address]  [phone]    [facility] - [address]  [address]   [facility] [address]  [phone]

## 2017-06-13 NOTE — LETTER
June 14, 2017      Irasema Kahn  [address]  [place] [address]        Dear Irasema,    Here are the resources we talked about over the phone.     [facility]  [facility] [facility]  [address]  [place] [address]  [phone]     [facility]  [address] [facility]  [address]  [place], [place] [address]  [phone]     [facility] - [address]  [address]   [facility] [address]  [phone]    Feel free to contact me with any questions.     Sincerely,    Desiree  Referral Coordinator

## 2017-06-14 NOTE — PROGRESS NOTES
Great!   I have called Irasema today and spoke with her about her options. She stated that she would like to have a longer term option for therapy. I have given her these resources over the phone as well as mailed them to her.   Desiree  06/14/17

## 2017-06-27 ENCOUNTER — TELEPHONE (OUTPATIENT)
Dept: FAMILY MEDICINE | Facility: CLINIC | Age: 32
End: 2017-06-27

## 2017-06-28 NOTE — TELEPHONE ENCOUNTER
"[facility] Answering Service Call  Caller: Irasema Kahn  : 1985  Callback Number: [phone]  Reason for Call: High Blood Pressure    Patient was calling in due to concerns of elevated blood pressure readings at home of 140s/90s. She notes when she checks her blood pressure, she gets heart racing sensation and headaches. It gets btter when she takes the hydroxyzine and trying to relax with a shower. She says these high readings have only happened \"a couple times this week\" but is otherwise only in he 130/70. This is making her anxious and she became tearful during conversation. Review of her chart shows unremarkable work up of causes of secondary HTN so far. She denies any headaches, numbness or tingling or weakness at this time. Advised to call to make appointment for BP follow up at [facility] tomorrow. She was calmer and agreed with plan.  She will take hydroxyzine as needed if anxious. Advised to seek medical care if developing concerning symptoms like severe headache, chest pain, focal weakness or BP >180/100.  Patient expressed understanding and agreement with plan.     Bere Corona MD PGY-2  [facility]  Pager: [phone]    "

## 2017-06-29 ENCOUNTER — OFFICE VISIT (OUTPATIENT)
Dept: FAMILY MEDICINE | Facility: CLINIC | Age: 32
End: 2017-06-29

## 2017-06-29 VITALS
SYSTOLIC BLOOD PRESSURE: 116 MMHG | BODY MASS INDEX: 20.86 KG/M2 | HEART RATE: 62 BPM | DIASTOLIC BLOOD PRESSURE: 70 MMHG | WEIGHT: 126.25 LBS | RESPIRATION RATE: 14 BRPM

## 2017-06-29 DIAGNOSIS — R00.2 PALPITATIONS: Primary | ICD-10-CM

## 2017-06-29 LAB — MAGNESIUM SERPL-MCNC: 1.9 MG/DL (ref 1.8–2.6)

## 2017-06-29 RX ORDER — IBUPROFEN 600 MG/1
TABLET, FILM COATED ORAL EVERY 6 HOURS PRN
COMMUNITY
End: 2022-06-13

## 2017-06-29 RX ORDER — LORAZEPAM 0.5 MG/1
0.5 TABLET ORAL EVERY 8 HOURS PRN
Qty: 10 TABLET | Refills: 0 | Status: SHIPPED | OUTPATIENT
Start: 2017-06-29 | End: 2022-06-13

## 2017-06-29 RX ORDER — METOPROLOL SUCCINATE 25 MG/1
12.5 TABLET, EXTENDED RELEASE ORAL DAILY
Qty: 45 TABLET | Refills: 1 | Status: SHIPPED | OUTPATIENT
Start: 2017-06-29 | End: 2022-06-13

## 2017-06-29 NOTE — LETTER
July 3, 2017      Irasema Kahn  [address]  [address]  [place] [address]        Please see below for your test results.    Resulted Orders   Magnesium ([facility])   Result Value Ref Range    Magnesium 1.9 1.8 - 2.6 mg/dL    Narrative    Test performed by:  [facility]  [address] [place] [address]         Irasema Kahn-    I am covering for Dr. Vega while she is on vacation.  Your magnesium level is in the normal range.  This is god news.  Please call the clinic at [phone] if you have any questions.      Juliana Mneg

## 2017-06-29 NOTE — MR AVS SNAPSHOT
After Visit Summary   6/29/2017    Irasema Kahn    MRN: [id number]           Patient Information     Date Of Birth          1985        Visit Information        Provider Department      6/29/2017 12:20 PM Shahla Vega MD [facility]        Today's Diagnoses     Palpitations    -  1       Follow-ups after your visit        Who to contact     Please call your clinic at [phone] to:    Ask questions about your health    Make or cancel appointments    Discuss your medicines    Learn about your test results    Speak to your doctor   If you have compliments or concerns about an experience at your clinic, or if you wish to file a complaint, please contact [facility] Patient Relations at [phone] or email us at [email or web address]         Additional Information About Your Visit        MyChart Information     [employer] gives you secure access to your electronic health record. If you see a primary care provider, you can also send messages to your care team and make appointments. If you have questions, please call your primary care clinic.  If you do not have a primary care provider, please call [phone] and they will assist you.      [employer] is an electronic gateway that provides easy, online access to your medical records. With [employer], you can request a clinic appointment, read your test results, renew a prescription or communicate with your care team.     To access your existing account, please contact your [facility] or call [phone] for assistance.        Care EveryWhere ID     This is your Care EveryWhere ID. This could be used by other organizations to access your [place] medical records  [id number]        Your Vitals Were     Pulse Respirations Last Period Breastfeeding? BMI (Body Mass Index)       62 14 05/11/2017 No 20.86 kg/m2        Blood Pressure from Last 3 Encounters:   06/29/17 116/70    06/01/17 138/85   05/30/17 124/80    Weight from Last 3 Encounters:   06/29/17 126 lb 4 oz (57.3 kg)   06/01/17 128 lb 12.8 oz (58.4 kg)   05/30/17 130 lb (59 kg)              We Performed the Following     Magnesium ([facility])          Today's Medication Changes          These changes are accurate as of: 6/29/17 11:59 PM.  If you have any questions, ask your nurse or doctor.               Start taking these medicines.        Dose/Directions    LORazepam 0.5 MG tablet   Commonly known as:  ATIVAN   Used for:  Palpitations   Started by:  Shahla Vega MD        Dose:  0.5 mg   Take 1 tablet (0.5 mg) by mouth every 8 hours as needed for anxiety   Quantity:  10 tablet   Refills:  0       metoprolol 25 MG 24 hr tablet   Commonly known as:  TOPROL-XL   Used for:  Palpitations   Started by:  Shahla Vega MD        Dose:  12.5 mg   Take 0.5 tablets (12.5 mg) by mouth daily   Quantity:  45 tablet   Refills:  1            Where to get your medicines      These medications were sent to [id number]     Phone:  [phone]     metoprolol 25 MG 24 hr tablet         Some of these will need a paper prescription and others can be bought over the counter.  Ask your nurse if you have questions.     Bring a paper prescription for each of these medications     LORazepam 0.5 MG tablet                Primary Care Provider Office Phone # Fax #    Shahla Vega [phone]       [facility] [address] [place] [address]        Equal Access to Services     [facility] AH: Hadii aad ku hadashlc Soalvaro, waaxda luqadaha, qaybta kaalmada adeegyakarthik, phoebe may. So [facility] [phone].    ATENCIÓN: Si habla español, tiene a dixon disposición servicios gratuitos de asistencia lingüística. Llame al [phone].    We comply with applicable federal civil rights laws and  [place] laws. We do not discriminate on the basis of race, color, national origin, age, disability sex, sexual orientation or gender identity.            Thank you!     Thank you for choosing [facility]  for your care. Our goal is always to provide you with excellent care. Hearing back from our patients is one way we can continue to improve our services. Please take a few minutes to complete the written survey that you may receive in the mail after your visit with us. Thank you!             Your Updated Medication List - Protect others around you: Learn how to safely use, store and throw away your medicines at www.disposemymeds.org.          This list is accurate as of: 6/29/17 11:59 PM.  Always use your most recent med list.                   Brand Name Dispense Instructions for use Diagnosis    CYCLOBENZAPRINE HCL PO      Take 5 mg by mouth as needed        desogestrel-ethinyl estradiol 0.15-30 MG-MCG per tablet    APRI     Take 1 tablet by mouth daily        hydrOXYzine 25 MG tablet    ATARAX    30 tablet    Take 1 tablet (25 mg) by mouth daily as needed for anxiety    Stress       ibuprofen 600 MG tablet    ADVIL/MOTRIN     Take by mouth every 6 hours as needed        LORazepam 0.5 MG tablet    ATIVAN    10 tablet    Take 1 tablet (0.5 mg) by mouth every 8 hours as needed for anxiety    Palpitations       METFORMIN HCL PO      Take 850 mg by mouth daily (with breakfast)        metoprolol 25 MG 24 hr tablet    TOPROL-XL    45 tablet    Take 0.5 tablets (12.5 mg) by mouth daily    Palpitations       nortriptyline 10 MG capsule    PAMELOR    30 capsule    Take 1 capsule (10 mg) by mouth At Bedtime Take 1 tab at bedtime    Spells       order for DME     1 each    Blood pressure monitor    Benign essential hypertension

## 2017-06-29 NOTE — PROGRESS NOTES
"Subjective   Irasema Kahn is a 31 year old female with a history including migraines and elevated blood pressures who presents for BP follow-up.    She was recently found to have this at a visit to an emergency room in [place], when she had chest pains.  See previous notes for more details.  Briefly, she has significant stress in her life, so recent efforts have focused on stress-reduction while also doing a work-up for secondary hypertension.  She has not started any anti-hypertensives yet.    The work-up included BMP, renal US w/ Dopplers, metanephrines, thyroid, renin, and aldosterone; it was entirely normal.  She has since started weekly therapy, and they are working on relaxation techniques.  She continues to have what she calls episodes -- during these, she starts to feel \"weird,\" with palpitations and \"super fast\" heart rate.  She then feels tired, \"not in this place\", and neck tightness.  This worries her, and she'll also get headaches or feeling like she can't focus.  She checks her BP during these episodes, and sometimes they are normal, and other times it's in the 130s-140s /90s.  These episodes self-resolve, and they occur about 2-3 times per week.  They are sometimes triggered by stressful situations, such as traffic, but other times by seemingly benign events.  She is very clear that the racing heart symptoms bring on the anxiety, rather than feeling anxious then developing racing heart.    Social: Non-smoker.    Objective   Vitals: /70  Pulse 62  Resp 14  Wt 126 lb 4 oz (57.3 kg)  LMP 05/11/2017  Breastfeeding? No  BMI 20.86 kg/m2  General: Pleasant. Young woman. Thin. No distress.  HEENT: No thyromegaly.  Heart: Regular rate and rhythm. No murmurs, rubs, or gallops.  Extremities: Radial and dorsal pedal pulses symmetric and intact. No peripheral edema.  Lungs: Clear to auscultation bilaterally. No wheezes or crackles. Good air movement.  Chest: No chest wall " tenderness.      Assessment & Plan   Follow-up of mild hypertension in a young, thin woman.  Thorough work-up of secondary causes was unrevealing.  She also has increased stress, for which she is participating in therapy weekly, and episodes of palpitations, racing heart, neck tightness, and difficulty focusing.  -- Add on magnesium check.  -- Discussed considering heart monitoring (Holter), but she declines this at this time, pending further management efforts first.  Previous EKG normal.  -- She elected to start a beta blocker for palpitations.  Started metoprolol XL 12.5 mg daily.  -- I assess her as low risk to trial a short-supply of benzodiazepines.  If these are successful in reducing her symptoms, then we will focus our efforts further on stress reduction.  Lorazepam 0.5 mg PRN, #10, R0.    Return to clinic in 1 month.

## 2017-07-02 NOTE — PROGRESS NOTES
Irasema Kahn-    I am covering for Dr. Vega while she is on vacation.  Your magnesium level is in the normal range.  This is god news.  Please call the clinic at [phone] if you have any questions.      Juliana Meng    Please send results to patient.

## 2017-10-13 ENCOUNTER — ALLIED HEALTH/NURSE VISIT (OUTPATIENT)
Dept: FAMILY MEDICINE | Facility: CLINIC | Age: 32
End: 2017-10-13

## 2017-10-13 VITALS
TEMPERATURE: 98 F | WEIGHT: 130 LBS | BODY MASS INDEX: 21.48 KG/M2 | DIASTOLIC BLOOD PRESSURE: 84 MMHG | HEART RATE: 78 BPM | SYSTOLIC BLOOD PRESSURE: 129 MMHG

## 2017-10-13 DIAGNOSIS — Z23 NEED FOR VACCINATION: Primary | ICD-10-CM

## 2017-10-13 NOTE — MR AVS SNAPSHOT
After Visit Summary   10/13/2017    Irasema Kahn    MRN: [id number]           Patient Information     Date Of Birth          1985        Visit Information        Provider Department      10/13/2017 1:45 PM Nurse, Amandeep [facility]        Today's Diagnoses     Need for vaccination    -  1       Follow-ups after your visit        Who to contact     Please call your clinic at [phone] to:    Ask questions about your health    Make or cancel appointments    Discuss your medicines    Learn about your test results    Speak to your doctor   If you have compliments or concerns about an experience at your clinic, or if you wish to file a complaint, please contact [facility] Patient Relations at [phone] or email us at [email or web address]         Additional Information About Your Visit        MyChart Information     [employer] gives you secure access to your electronic health record. If you see a primary care provider, you can also send messages to your care team and make appointments. If you have questions, please call your primary care clinic.  If you do not have a primary care provider, please call [phone] and they will assist you.      Hingi is an electronic gateway that provides easy, online access to your medical records. With [employer], you can request a clinic appointment, read your test results, renew a prescription or communicate with your care team.     To access your existing account, please contact your [facility] or call [phone] for assistance.        Care EveryWhere ID     This is your Care EveryWhere ID. This could be used by other organizations to access your [place] medical records  [id number]        Your Vitals Were     Pulse Temperature BMI (Body Mass Index)             78 98  F (36.7  C) 21.48 kg/m2          Blood Pressure from Last 3 Encounters:   10/13/17 129/84   06/29/17 116/70   06/01/17  138/85    Weight from Last 3 Encounters:   10/13/17 130 lb (59 kg)   06/29/17 126 lb 4 oz (57.3 kg)   06/01/17 128 lb 12.8 oz (58.4 kg)              We Performed the Following     ADMIN VACCINE, INITIAL     TDAP VACCINE (BOOSTRIX)        Primary Care Provider Office Phone # Fax #    Shahla JILLIAN Vega [phone]       [facility] [address] [place] [address]        Equal Access to Services     FELIPE BRADFORD : Hadii aad ku hadasho Soomaali, waaxda luqadaha, qaybta kaalmada adeegyada, waxay idiin hayaan adeeg [facility] . So [facility] [phone].    ATENCIÓN: Si habla español, tiene a dixon disposición servicios gratuitos de asistencia lingüística. [facility] [phone].    We comply with applicable federal civil rights laws and [place] laws. We do not discriminate on the basis of race, color, national origin, age, disability, sex, sexual orientation, or gender identity.            Thank you!     Thank you for choosing [facility]  for your care. Our goal is always to provide you with excellent care. Hearing back from our patients is one way we can continue to improve our services. Please take a few minutes to complete the written survey that you may receive in the mail after your visit with us. Thank you!             Your Updated Medication List - Protect others around you: Learn how to safely use, store and throw away your medicines at www.disposemymeds.org.          This list is accurate as of: 10/13/17  2:20 PM.  Always use your most recent med list.                   Brand Name Dispense Instructions for use Diagnosis    CYCLOBENZAPRINE HCL PO      Take 5 mg by mouth as needed        desogestrel-ethinyl estradiol 0.15-30 MG-MCG per tablet    APRI     Take 1 tablet by mouth daily        hydrOXYzine 25 MG tablet    ATARAX    30 tablet    Take 1 tablet (25 mg) by mouth daily as needed for anxiety    Stress       ibuprofen 600 MG tablet    ADVIL/MOTRIN     Take by mouth every 6  hours as needed        LORazepam 0.5 MG tablet    ATIVAN    10 tablet    Take 1 tablet (0.5 mg) by mouth every 8 hours as needed for anxiety    Palpitations       METFORMIN HCL PO      Take 850 mg by mouth daily (with breakfast)        metoprolol 25 MG 24 hr tablet    TOPROL-XL    45 tablet    Take 0.5 tablets (12.5 mg) by mouth daily    Palpitations       nortriptyline 10 MG capsule    PAMELOR    30 capsule    Take 1 capsule (10 mg) by mouth At Bedtime Take 1 tab at bedtime    Spells       order for DME     1 each    Blood pressure monitor    Benign essential hypertension

## 2017-12-31 ENCOUNTER — HEALTH MAINTENANCE LETTER (OUTPATIENT)
Age: 32
End: 2017-12-31

## 2018-02-22 ENCOUNTER — RADIANT APPOINTMENT (OUTPATIENT)
Dept: CT IMAGING | Facility: CLINIC | Age: 33
End: 2018-02-22
Attending: FAMILY MEDICINE
Payer: COMMERCIAL

## 2018-02-22 DIAGNOSIS — R10.12 ABDOMINAL PAIN, LEFT UPPER QUADRANT: ICD-10-CM

## 2018-02-22 RX ORDER — IOPAMIDOL 755 MG/ML
80 INJECTION, SOLUTION INTRAVASCULAR ONCE
Status: COMPLETED | OUTPATIENT
Start: 2018-02-22 | End: 2018-02-22

## 2018-02-22 RX ADMIN — IOPAMIDOL 80 ML: 755 INJECTION, SOLUTION INTRAVASCULAR at 15:20

## 2018-02-22 NOTE — DISCHARGE INSTRUCTIONS

## 2018-02-23 LAB
CREAT BLD-MCNC: 0.9 MG/DL (ref 0.52–1.04)
GFR SERPL CREATININE-BSD FRML MDRD: 72 ML/MIN/1.7M2

## 2018-03-18 ENCOUNTER — NURSE TRIAGE (OUTPATIENT)
Dept: NURSING | Facility: CLINIC | Age: 33
End: 2018-03-18

## 2018-03-19 NOTE — TELEPHONE ENCOUNTER
"  Reason for Disposition    BP  >= 160/100    Additional Information    Negative: Difficult to awaken or acting confused  (e.g., disoriented, slurred speech)    Negative: [1] Weakness of the face, arm or leg on one side of the body AND [2] new onset    Negative: [1] Numbness of the face, arm or leg on one side of the body AND [2] new onset    Negative: [1] Loss of speech or garbled speech AND [2] new onset    Negative: Passed out (i.e., lost consciousness, collapsed and was not responding)    Negative: Sounds like a life-threatening emergency to the triager    Negative: Unable to walk, or can only walk with assistance (e.g., requires support)    Negative: Stiff neck (can't touch chin to chest)    Negative: Severe pain in one eye    Negative: [1] Other family members (or roommates) with headaches AND [2] possibility of carbon monoxide exposure    Negative: [1] SEVERE headache (e.g., excruciating) AND [2] \"worst headache\" of life    Negative: [1] SEVERE headache AND [2] sudden-onset (i.e., reaching maximum intensity within seconds)    Negative: [1] SEVERE headache AND [2] fever    Negative: Loss of vision or double vision (Exception: same as prior migraines)    Negative: [1] Fever > 100.5 F (38.1 C) AND [2] diabetes mellitus or weak immune system (e.g., HIV positive, cancer chemo, splenectomy, organ transplant, chronic steroids)    Negative: Patient sounds very sick or weak to the triager    Negative: [1] SEVERE headache (e.g., excruciating) AND [2] not improved after 2 hours of pain medicine    Negative: [1] Vomiting AND [2] 2 or more times (Exception: similar to previous migraines)    Negative: Fever > 104 F (40 C)    Negative: [1] MODERATE headache (e.g., interferes with normal activities) AND [2] present > 24 hours AND [3] unexplained  (Exceptions: analgesics not tried, typical migraine, or headache part of viral illness)    Negative: [1] New headache AND [2] weak immune system (e.g., HIV positive, cancer chemo, " splenectomy, organ transplant, chronic steroids)    Negative: [1] New headache AND [2] age > 50    Negative: [1] Sinus pain of forehead AND [2] yellow or green nasal discharge    Negative: Fever present > 3 days (72 hours)    Negative: Difficult to awaken or acting confused  (e.g., disoriented, slurred speech)    Negative: Severe difficulty breathing (e.g., struggling for each breath, speaks in single words)    Negative: [1] Weakness of the face, arm or leg on one side of the body AND [2] new onset    Negative: [1] Numbness (i.e., loss of sensation) of the face, arm or leg on one side of the body AND [2] new onset    Negative: [1] Chest pain lasts > 5 minutes AND [2] history of heart disease  (i.e., heart attack, bypass surgery, angina, angioplasty, CHF)    Negative: [1] Chest pain AND [2] took nitrogylcerin AND [3] pain was not relieved    Negative: Sounds like a life-threatening emergency to the triager    Negative: Symptom is main concern  (e.g., headache, chest pain)    Negative: Low blood pressure is main concern    Negative: [1] BP  >= 160 / 100 AND [2] cardiac or neurologic symptoms    (e.g., chest pain, difficulty breathing, unsteady gait, blurred vision)    Negative: [1] Pregnant AND [2] new hand or face swelling    Negative: [1] Pregnant > 20 weeks AND [2] BP  >= 140/90    Protocols used: HIGH BLOOD PRESSURE-ADULT-AH, HEADACHE-ADULT-AH

## 2019-11-20 ENCOUNTER — TRANSFERRED RECORDS (OUTPATIENT)
Dept: HEALTH INFORMATION MANAGEMENT | Facility: CLINIC | Age: 34
End: 2019-11-20

## 2019-11-20 ENCOUNTER — MEDICAL CORRESPONDENCE (OUTPATIENT)
Dept: HEALTH INFORMATION MANAGEMENT | Facility: CLINIC | Age: 34
End: 2019-11-20

## 2019-11-25 ENCOUNTER — TRANSCRIBE ORDERS (OUTPATIENT)
Dept: OTHER | Age: 34
End: 2019-11-25

## 2019-11-25 DIAGNOSIS — L98.9 SKIN LESION: Primary | ICD-10-CM

## 2020-01-28 ENCOUNTER — OFFICE VISIT (OUTPATIENT)
Dept: DERMATOLOGY | Facility: CLINIC | Age: 35
End: 2020-01-28
Attending: FAMILY MEDICINE
Payer: COMMERCIAL

## 2020-01-28 DIAGNOSIS — R21 RASH: Primary | ICD-10-CM

## 2020-01-28 RX ORDER — LIDOCAINE HYDROCHLORIDE AND EPINEPHRINE 10; 10 MG/ML; UG/ML
3 INJECTION, SOLUTION INFILTRATION; PERINEURAL ONCE
Status: ACTIVE | OUTPATIENT
Start: 2020-01-28

## 2020-01-28 ASSESSMENT — PAIN SCALES - GENERAL: PAINLEVEL: NO PAIN (0)

## 2020-01-28 NOTE — LETTER
1/28/2020       RE: Irsaema Kahn  [address] [place], [place] [id number]     Dear Colleague,    Thank you for referring your patient, Irasema Kahn, to the [facility] at [facility]. Please see a copy of my visit note below.    CHIEF COMPLAINT:  Recurrent pigmented spots on right cheek.      HISTORY OF PRESENT ILLNESS:  Irasema is a very pleasant, 34-year-old female originally from [facility] [facility] who is a new patient to our clinic, presenting with recurrent pigmented bumps on the right side of her face over a period of several years.  Previously, she saw a doctor in [place] who prescribed a cream, which she said gave her sun sensitivity, but was mostly effective in partially removing these spots.  More recently, she saw an outside dermatologist (Dr. Antonio Miller) who prescribed imiquimod cream.  She used this for several weeks, but noticed spreading of the spots and does not believe this was helpful.  She is here today for a second opinion and possible treatment options.  She has not used any other topical preparations that she is aware of.  She denies any other involvement of sites other than the right side of her face, but does note that these spots have been spreading over the past few months.  They are slightly itchy and scaly.      REVIEW OF SYSTEMS:  No recent fevers or chills.  No unexplained illnesses.  No nonhealing oral sores.      SOCIAL HISTORY:  As noted above, she is originally from [facility] [place].      PAST MEDICAL HISTORY:  She has a history of hyperinsulinemia and took metformin.  She does not have any known history of immunosuppression.      FAMILY HISTORY:  No family members have similar spots on their skin.      PHYSICAL EXAMINATION:   GENERAL:  This is a well-appearing, well-nourished female with a normal mood and affect who is oriented x3.   SKIN:  A cutaneous exam of the head, neck, chest, back and axillae was performed.  On the right cheek and  jawline, there are many scattered, 2 mm, flat-topped, pink-brown, well-marginated, ovoid papules, which are finely scaled.      IN-OFFICE EXAMINATION:  KENAN preparation is negative for fungal elements.      ASSESSMENT AND PLAN:  Hyperpigmented papules regionally grouped on the right face.  Previous physicians have suspected flat warts (verruca plana), and this seems to be the most likely diagnosis.  The differential diagnosis also includes tinea versicolor (though a KOH exam was negative today) or less likely epidermodysplasia verruciformis.  A punch biopsy was performed today for clarification.  Please see the procedure note below.  We will defer any treatment decisions until her followup visit.  Given the number of lesions, cryotherapy does not seem feasible at this time.  Reasonable options would include a trial of Efudex, as she has previously failed imiquimod.  Topical salicylic acid and topical retinoids are also other reasonable choices.  There is also some evidence for oral H2 blockers, including cimetidine and ranitidine (though ranitidine has recently had a recall relating to possible carcinogenic ingredients).  It may be reasonable to treat her with high-dose cimetidine as we sometimes do for conventional warts if she fails topical therapies.  She will follow up in our clinic in 1 week's time for her biopsy results and treatment discussion.      PROCEDURE NOTE:  After signed informed consent, the affected area on the right jawline was swabbed with an alcohol pad and injected with 1% lidocaine with epinephrine.  A 2 mm punch biopsy was taken and sent for histopathology.  The defect was closed with a single Prolene 5-0 suture and covered with petrolatum and a bandage.  The patient tolerated this without complication.     Chad Le MD  Dermatology Attending

## 2020-01-28 NOTE — NURSING NOTE
"Chief Complaint   Patient presents with     Derm Problem     Irasema is here today for a second opinion for warts on her face. She states \" the warts are on the right side of my face and spreding to the left side. I was prescribed imiquimod and it made it worse\"     Tara Stephenson, RMA  "

## 2020-01-28 NOTE — PROGRESS NOTES
CHIEF COMPLAINT:  Recurrent pigmented spots on right cheek.      HISTORY OF PRESENT ILLNESS:  Irasema is a very pleasant, 34-year-old female originally from [facility] who is a new patient to our clinic, presenting with recurrent pigmented bumps on the right side of her face over a period of several years.  Previously, she saw a doctor in [facility] who prescribed a cream, which she said gave her sun sensitivity, but was mostly effective in partially removing these spots.  More recently, she saw an outside dermatologist (Dr. Antonio Miller) who prescribed imiquimod cream.  She used this for several weeks, but noticed spreading of the spots and does not believe this was helpful.  She is here today for a second opinion and possible treatment options.  She has not used any other topical preparations that she is aware of.  She denies any other involvement of sites other than the right side of her face, but does note that these spots have been spreading over the past few months.  They are slightly itchy and scaly.      REVIEW OF SYSTEMS:  No recent fevers or chills.  No unexplained illnesses.  No nonhealing oral sores.      SOCIAL HISTORY:  As noted above, she is originally from [facility].      PAST MEDICAL HISTORY:  She has a history of hyperinsulinemia and took metformin.  She does not have any known history of immunosuppression.      FAMILY HISTORY:  No family members have similar spots on their skin.      PHYSICAL EXAMINATION:   GENERAL:  This is a well-appearing, well-nourished female with a normal mood and affect who is oriented x3.   SKIN:  A cutaneous exam of the head, neck, chest, back and axillae was performed.  On the right cheek and jawline, there are many scattered, 2 mm, flat-topped, pink-brown, well-marginated, ovoid papules, which are finely scaled.      IN-OFFICE EXAMINATION:  KENAN preparation is negative for fungal elements.      ASSESSMENT AND PLAN:  Hyperpigmented papules regionally grouped on the right face.   Previous physicians have suspected flat warts (verruca plana), and this seems to be the most likely diagnosis.  The differential diagnosis also includes tinea versicolor (though a KOH exam was negative today) or less likely epidermodysplasia verruciformis.  A punch biopsy was performed today for clarification.  Please see the procedure note below.  We will defer any treatment decisions until her followup visit.  Given the number of lesions, cryotherapy does not seem feasible at this time.  Reasonable options would include a trial of Efudex, as she has previously failed imiquimod.  Topical salicylic acid and topical retinoids are also other reasonable choices.  There is also some evidence for oral H2 blockers, including cimetidine and ranitidine (though ranitidine has recently had a recall relating to possible carcinogenic ingredients).  It may be reasonable to treat her with high-dose cimetidine as we sometimes do for conventional warts if she fails topical therapies.  She will follow up in our clinic in 1 week's time for her biopsy results and treatment discussion.      PROCEDURE NOTE:  After signed informed consent, the affected area on the right jawline was swabbed with an alcohol pad and injected with 1% lidocaine with epinephrine.  A 2 mm punch biopsy was taken and sent for histopathology.  The defect was closed with a single Prolene 5-0 suture and covered with petrolatum and a bandage.  The patient tolerated this without complication.     Chad Le MD  Dermatology Attending

## 2020-01-28 NOTE — PATIENT INSTRUCTIONS

## 2020-02-03 LAB — COPATH REPORT: NORMAL

## 2020-02-04 ENCOUNTER — APPOINTMENT (OUTPATIENT)
Dept: LAB | Facility: CLINIC | Age: 35
End: 2020-02-04
Payer: COMMERCIAL

## 2020-02-04 ENCOUNTER — OFFICE VISIT (OUTPATIENT)
Dept: DERMATOLOGY | Facility: CLINIC | Age: 35
End: 2020-02-04
Payer: COMMERCIAL

## 2020-02-04 DIAGNOSIS — B07.8 FLAT WART: ICD-10-CM

## 2020-02-04 DIAGNOSIS — Z79.899 ENCOUNTER FOR LONG-TERM (CURRENT) USE OF HIGH-RISK MEDICATION: Primary | ICD-10-CM

## 2020-02-04 LAB — HCG UR QL: POSITIVE

## 2020-02-04 ASSESSMENT — PAIN SCALES - GENERAL: PAINLEVEL: NO PAIN (0)

## 2020-02-04 NOTE — NURSING NOTE
Dermatology Rooming Note    Irasema Kahn's goals for this visit include:   Chief Complaint   Patient presents with     Derm Problem     Biopsy follow up, no changes noted.     Uzma Cooper LPN

## 2020-02-04 NOTE — PATIENT INSTRUCTIONS
- The spots on your face are called verruca plana (flat warts)  - Apply the 5-Flourouracil cream to the right side of your face (not just on individual spots on your face). Apply a thin layer twice a day, although if your skin is getting too red and too sore then you can take a break. A little redness and puffiness is good, but not to the point where it is becoming painful or extremely red/swollen. Do this for two weeks.  - This crea will make you more sensitive to sun, so use caution when going outside in the sun  - Do not try to become pregnant/use contraception during this treatment and for four weeks after the treatment, so for 6 weeks total

## 2020-02-04 NOTE — PROGRESS NOTES
[facility] Dermatology Note      Dermatology Problem List:  1. Verruca plana, right cheek and jaw line  - Past tx: 5-FU, imiquimod   - Current tx: If NOT pregnant, 2 weeks 5-FU BID    Encounter Date: Feb 4, 2020    CC:  Chief Complaint   Patient presents with     Derm Problem     Biopsy follow up, no changes noted.         History of Present Illness:  Ms. Irasema Kahn is a 34 year old female who presents as a follow-up for flat warts. The patient was last seen 1/28/2019 when she had a biopsy of the lesions on her right face before, which came back as verruca plana. She reports that the warts are still pruritic but she avoids scratching them. They are not painful. They have spread in the past after she tried using imiquimod but have not spread recently, and she doesn't believe they are anywhere else on her body. She reports trying a cream in [facility] that was likely 5-FU that worked well to reduce the works. Importantly, she is actively trying to become pregnant and states it is possible that she could already be pregnant.       Past Medical History:   Patient Active Problem List   Diagnosis     Migraine     Hypertension     Rash     Past Medical History:   Diagnosis Date     Epilepsy posttraumatic (H)      Hyperinsulinemia      No past surgical history on file.    Social History:  Patient reports that she has never smoked. She has never used smokeless tobacco. She reports current alcohol use. She reports that she does not use drugs.    Family History:  Family History   Problem Relation Age of Onset     Hypertension Mother      Cancer Mother      Hypertension Father      Cerebrovascular Disease Maternal Grandmother      Hypertension Maternal Grandmother      Cancer Maternal Grandfather      Cerebrovascular Disease Maternal Grandfather        Medications:  Current Outpatient Medications   Medication Sig Dispense Refill     CYCLOBENZAPRINE HCL PO Take 5 mg by mouth as needed         desogestrel-ethinyl estradiol (APRI) 0.15-30 MG-MCG per tablet Take 1 tablet by mouth daily       hydrOXYzine (ATARAX) 25 MG tablet Take 1 tablet (25 mg) by mouth daily as needed for anxiety (Patient not taking: Reported on 1/28/2020) 30 tablet 1     ibuprofen (ADVIL/MOTRIN) 600 MG tablet Take by mouth every 6 hours as needed       LORazepam (ATIVAN) 0.5 MG tablet Take 1 tablet (0.5 mg) by mouth every 8 hours as needed for anxiety (Patient not taking: Reported on 1/28/2020) 10 tablet 0     METFORMIN HCL PO Take 850 mg by mouth daily (with breakfast)       metoprolol (TOPROL-XL) 25 MG 24 hr tablet Take 0.5 tablets (12.5 mg) by mouth daily (Patient not taking: Reported on 1/28/2020) 45 tablet 1     nortriptyline (PAMELOR) 10 MG capsule Take 1 capsule (10 mg) by mouth At Bedtime Take 1 tab at bedtime (Patient not taking: Reported on 5/30/2017) 30 capsule 3     order for DME Blood pressure monitor 1 each 0     Allergies   Allergen Reactions     No Clinical Screening - See Comments      Antibiotic used in [place].  Sulfa based     Penicillins        Review of Systems:  -Constitutional: Otherwise feeling well today, in usual state of health.  -Skin: As above in HPI. No additional skin concerns.    Physical exam:  Vitals: There were no vitals taken for this visit.  GEN: This is a well developed, well-nourished female in no acute distress, in a pleasant mood.    SKIN: Focused examination of the face was performed.  - Along right cheek and jaw line there are many scattered, 2 mm flat-topped, pink-brown, well-marginated, ovoid papules with fine scale  -No other lesions of concern on areas examined.     Impression/Plan:  1. Verruca plana    Patient is actively trying to become treatment and may be pregnant today. Discussed treatment options with patient, including 5-FU and isotretinoin, but these are not safe during pregnancy. Pt agreed to try treatment with 5-FU if she is not pregnant. If NOT pregnant, 5-FU to right side of  cheek and jaw line BID for 2 weeks. Advised patient to take breaks if the skin becomes too red/swollen/painful, although a little bit of redness and swelling is ok. Should use contraception during this time and for four weeks after treatment finishes to avoid becoming pregnant. If she is pregnant today, will not start any treatment.    Follow up B-HCG    CC Referred MD Isaak  No address on file on close of this encounter.  Follow-up in 8 weeks, earlier for new or changing lesions.     Staff Involved:  IAlondra, MS3, saw and examined the patient in the presence of Dr. Le.    Update 2/5/20:  Patient's pregnancy test was positive.  Patient was pleased to receive this news.  We discussed that 5FU is not a safe consideration during pregnancy (she had not started this medication and will not do so).  We reviewed potential treatment options which are safe during pregnancy and agreed that topical azelaic acid and salicylic acid cream are both safe choices when used in limited fashion as she would be applying them.  We can revisit other options once she has completed her pregnancy.    I was present with the medical student who participated in the service and in the documentation.  I have verified the history and personally performed the physical exam and medical decision making.  I agree with the assessment and plan of care as documented in the note.      Chad Le MD  Dermatology Attending

## 2020-02-04 NOTE — LETTER
2/4/2020     RE: Irasema Kahn  [address]  [facility] [address]     Dear Colleague,    Thank you for referring your patient, Irasema Kahn, to the [facility] DERMATOLOGY at [facility]. Please see a copy of my visit note below.    [facility] Dermatology Note      Dermatology Problem List:  1. Verruca plana, right cheek and jaw line  - Past tx: 5-FU, imiquimod   - Current tx: If NOT pregnant, 2 weeks 5-FU BID    Encounter Date: Feb 4, 2020    CC:  Chief Complaint   Patient presents with     Derm Problem     Biopsy follow up, no changes noted.         History of Present Illness:  Ms. Irasema Kahn is a 34 year old female who presents as a follow-up for flat warts. The patient was last seen 1/28/2019 when she had a biopsy of the lesions on her right face before, which came back as verruca plana. She reports that the warts are still pruritic but she avoids scratching them. They are not painful. They have spread in the past after she tried using imiquimod but have not spread recently, and she doesn't believe they are anywhere else on her body. She reports trying a cream in [place] that was likely 5-FU that worked well to reduce the works. Importantly, she is actively trying to become pregnant and states it is possible that she could already be pregnant.       Past Medical History:   Patient Active Problem List   Diagnosis     Migraine     Hypertension     Rash     Past Medical History:   Diagnosis Date     Epilepsy posttraumatic (H)      Hyperinsulinemia      No past surgical history on file.    Social History:  Patient reports that she has never smoked. She has never used smokeless tobacco. She reports current alcohol use. She reports that she does not use drugs.    Family History:  Family History   Problem Relation Age of Onset     Hypertension Mother      Cancer Mother      Hypertension Father      Cerebrovascular Disease Maternal  Grandmother      Hypertension Maternal Grandmother      Cancer Maternal Grandfather      Cerebrovascular Disease Maternal Grandfather        Medications:  Current Outpatient Medications   Medication Sig Dispense Refill     CYCLOBENZAPRINE HCL PO Take 5 mg by mouth as needed        desogestrel-ethinyl estradiol (APRI) 0.15-30 MG-MCG per tablet Take 1 tablet by mouth daily       hydrOXYzine (ATARAX) 25 MG tablet Take 1 tablet (25 mg) by mouth daily as needed for anxiety (Patient not taking: Reported on 1/28/2020) 30 tablet 1     ibuprofen (ADVIL/MOTRIN) 600 MG tablet Take by mouth every 6 hours as needed       LORazepam (ATIVAN) 0.5 MG tablet Take 1 tablet (0.5 mg) by mouth every 8 hours as needed for anxiety (Patient not taking: Reported on 1/28/2020) 10 tablet 0     METFORMIN HCL PO Take 850 mg by mouth daily (with breakfast)       metoprolol (TOPROL-XL) 25 MG 24 hr tablet Take 0.5 tablets (12.5 mg) by mouth daily (Patient not taking: Reported on 1/28/2020) 45 tablet 1     nortriptyline (PAMELOR) 10 MG capsule Take 1 capsule (10 mg) by mouth At Bedtime Take 1 tab at bedtime (Patient not taking: Reported on 5/30/2017) 30 capsule 3     order for DME Blood pressure monitor 1 each 0     Allergies   Allergen Reactions     No Clinical Screening - See Comments      Antibiotic used in [place].  Sulfa based     Penicillins        Review of Systems:  -Constitutional: Otherwise feeling well today, in usual state of health.  -Skin: As above in HPI. No additional skin concerns.    Physical exam:  Vitals: There were no vitals taken for this visit.  GEN: This is a well developed, well-nourished female in no acute distress, in a pleasant mood.    SKIN: Focused examination of the face was performed.  - Along right cheek and jaw line there are many scattered, 2 mm flat-topped, pink-brown, well-marginated, ovoid papules with fine scale  -No other lesions of concern on areas examined.     Impression/Plan:  1. Verruca plana    Patient  is actively trying to become treatment and may be pregnant today. Discussed treatment options with patient, including 5-FU and isotretinoin, but these are not safe during pregnancy. Pt agreed to try treatment with 5-FU if she is not pregnant. If NOT pregnant, 5-FU to right side of cheek and jaw line BID for 2 weeks. Advised patient to take breaks if the skin becomes too red/swollen/painful, although a little bit of redness and swelling is ok. Should use contraception during this time and for four weeks after treatment finishes to avoid becoming pregnant. If she is pregnant today, will not start any treatment.    Follow up B-HCG    CC Referred MD Isaak  No address on file on close of this encounter.  Follow-up in 8 weeks, earlier for new or changing lesions.     Staff Involved:  IAlondra, MS3, saw and examined the patient in the presence of Dr. Le.    Update 2/5/20:  Patient's pregnancy test was positive.  Patient was pleased to receive this news.  We discussed that 5FU is not a safe consideration during pregnancy (she had not started this medication and will not do so).  We reviewed potential treatment options which are safe during pregnancy and agreed that topical azelaic acid and salicylic acid cream are both safe choices when used in limited fashion as she would be applying them.  We can revisit other options once she has completed her pregnancy.    I was present with the medical student who participated in the service and in the documentation.  I have verified the history and personally performed the physical exam and medical decision making.  I agree with the assessment and plan of care as documented in the note.      Chad Le MD  Dermatology Attending

## 2020-02-06 RX ORDER — AZELAIC ACID 0.15 G/G
GEL TOPICAL
Qty: 50 G | Refills: 1 | Status: SHIPPED | OUTPATIENT
Start: 2020-02-06 | End: 2022-06-13

## 2020-02-19 ENCOUNTER — ANCILLARY PROCEDURE (OUTPATIENT)
Dept: ULTRASOUND IMAGING | Facility: CLINIC | Age: 35
End: 2020-02-19
Attending: OBSTETRICS & GYNECOLOGY
Payer: COMMERCIAL

## 2020-02-19 DIAGNOSIS — O26.851 SPOTTING IN FIRST TRIMESTER: ICD-10-CM

## 2020-03-10 ENCOUNTER — HEALTH MAINTENANCE LETTER (OUTPATIENT)
Age: 35
End: 2020-03-10

## 2020-03-12 ENCOUNTER — PRENATAL OFFICE VISIT - HEALTHEAST (OUTPATIENT)
Dept: MIDWIFE SERVICES | Facility: CLINIC | Age: 35
End: 2020-03-12

## 2020-03-12 ENCOUNTER — RECORDS - HEALTHEAST (OUTPATIENT)
Dept: ADMINISTRATIVE | Facility: OTHER | Age: 35
End: 2020-03-12

## 2020-03-12 ENCOUNTER — COMMUNICATION - HEALTHEAST (OUTPATIENT)
Dept: TELEHEALTH | Facility: CLINIC | Age: 35
End: 2020-03-12

## 2020-03-12 DIAGNOSIS — O34.41 HISTORY OF LOOP ELECTROSURGICAL EXCISION PROCEDURE (LEEP) OF CERVIX AFFECTING PREGNANCY IN FIRST TRIMESTER: ICD-10-CM

## 2020-03-12 DIAGNOSIS — A60.00 GENITAL HERPES SIMPLEX, UNSPECIFIED SITE: ICD-10-CM

## 2020-03-12 DIAGNOSIS — Z98.890 HISTORY OF LOOP ELECTROSURGICAL EXCISION PROCEDURE (LEEP) OF CERVIX AFFECTING PREGNANCY IN FIRST TRIMESTER: ICD-10-CM

## 2020-03-12 DIAGNOSIS — Z91.89 AT RISK FOR COMPLICATION OF PREGNANCY: ICD-10-CM

## 2020-03-12 DIAGNOSIS — O09.519 SUPERVISION OF HIGH-RISK PREGNANCY OF ELDERLY PRIMIGRAVIDA: ICD-10-CM

## 2020-03-12 DIAGNOSIS — Z13.79 GENETIC SCREENING: ICD-10-CM

## 2020-03-12 LAB
BASOPHILS # BLD AUTO: 0.1 THOU/UL (ref 0–0.2)
BASOPHILS NFR BLD AUTO: 1 % (ref 0–2)
EOSINOPHIL # BLD AUTO: 0.1 THOU/UL (ref 0–0.4)
EOSINOPHIL NFR BLD AUTO: 1 % (ref 0–6)
ERYTHROCYTE [DISTWIDTH] IN BLOOD BY AUTOMATED COUNT: 13.2 % (ref 11–14.5)
HBA1C MFR BLD: 5.6 % (ref 3.5–6)
HCT VFR BLD AUTO: 39.9 % (ref 35–47)
HGB BLD-MCNC: 13 G/DL (ref 12–16)
HIV 1+2 AB+HIV1 P24 AG SERPL QL IA: NEGATIVE
LYMPHOCYTES # BLD AUTO: 2.5 THOU/UL (ref 0.8–4.4)
LYMPHOCYTES NFR BLD AUTO: 20 % (ref 20–40)
MCH RBC QN AUTO: 29.4 PG (ref 27–34)
MCHC RBC AUTO-ENTMCNC: 32.6 G/DL (ref 32–36)
MCV RBC AUTO: 90 FL (ref 80–100)
MONOCYTES # BLD AUTO: 0.8 THOU/UL (ref 0–0.9)
MONOCYTES NFR BLD AUTO: 7 % (ref 2–10)
NEUTROPHILS # BLD AUTO: 8.6 THOU/UL (ref 2–7.7)
NEUTROPHILS NFR BLD AUTO: 71 % (ref 50–70)
PLATELET # BLD AUTO: 341 THOU/UL (ref 140–440)
PMV BLD AUTO: 11.2 FL (ref 8.5–12.5)
RBC # BLD AUTO: 4.42 MILL/UL (ref 3.8–5.4)
WBC: 12.1 THOU/UL (ref 4–11)

## 2020-03-12 ASSESSMENT — MIFFLIN-ST. JEOR: SCORE: 1288.59

## 2020-03-12 ASSESSMENT — EDINBURGH POSTNATAL DEPRESSION SCALE (EPDS): TOTAL SCORE: 4

## 2020-03-13 LAB
ABO/RH(D): NORMAL
ABORH REPEAT: NORMAL
ANTIBODY SCREEN: NEGATIVE
BACTERIA SPEC CULT: NO GROWTH
C TRACH DNA SPEC QL PROBE+SIG AMP: NEGATIVE
HBV SURFACE AG SERPL QL IA: NEGATIVE
N GONORRHOEA DNA SPEC QL NAA+PROBE: NEGATIVE
RUBV IGG SERPL QL IA: POSITIVE
T PALLIDUM AB SER QL: NEGATIVE

## 2020-03-14 LAB — LEAD BLDV-MCNC: <2 UG/DL (ref 0–4.9)

## 2020-03-15 LAB
COLLECTION METHOD: NORMAL
LEAD BLD-MCNC: NORMAL UG/DL

## 2020-03-17 ENCOUNTER — COMMUNICATION - HEALTHEAST (OUTPATIENT)
Dept: ADMINISTRATIVE | Facility: CLINIC | Age: 35
End: 2020-03-17

## 2020-04-03 ENCOUNTER — MYC MEDICAL ADVICE (OUTPATIENT)
Dept: DERMATOLOGY | Facility: CLINIC | Age: 35
End: 2020-04-03

## 2020-04-03 ENCOUNTER — TELEPHONE (OUTPATIENT)
Dept: DERMATOLOGY | Facility: CLINIC | Age: 35
End: 2020-04-03

## 2020-04-03 NOTE — TELEPHONE ENCOUNTER
Informed patient that due to the COVID-19 virus, we are minimizing non-emergent appointments and would like to offer her a teledermatology visit.     Provided patient with the clinic number to discuss appointment details.

## 2020-04-03 NOTE — TELEPHONE ENCOUNTER
Patient returned the call, wanted to cancel because she states she has not done the treatment due to being pregnant therefore there is no reason to have a follow up. Pt stated she will call or mychart to reschedule.    Shannon Segal LPN

## 2020-04-16 ENCOUNTER — COMMUNICATION - HEALTHEAST (OUTPATIENT)
Dept: OBGYN | Facility: HOSPITAL | Age: 35
End: 2020-04-16

## 2020-04-16 ENCOUNTER — COMMUNICATION - HEALTHEAST (OUTPATIENT)
Dept: MIDWIFE SERVICES | Facility: CLINIC | Age: 35
End: 2020-04-16

## 2020-04-16 DIAGNOSIS — R00.2 HEART PALPITATIONS: ICD-10-CM

## 2020-04-30 ENCOUNTER — OFFICE VISIT - HEALTHEAST (OUTPATIENT)
Dept: MIDWIFE SERVICES | Facility: CLINIC | Age: 35
End: 2020-04-30

## 2020-04-30 ENCOUNTER — COMMUNICATION - HEALTHEAST (OUTPATIENT)
Dept: ADMINISTRATIVE | Facility: CLINIC | Age: 35
End: 2020-04-30

## 2020-04-30 DIAGNOSIS — O09.519 SUPERVISION OF HIGH-RISK PREGNANCY OF ELDERLY PRIMIGRAVIDA: ICD-10-CM

## 2020-04-30 DIAGNOSIS — Z13.79 GENETIC SCREENING: ICD-10-CM

## 2020-05-01 ENCOUNTER — AMBULATORY - HEALTHEAST (OUTPATIENT)
Dept: MATERNAL FETAL MEDICINE | Facility: HOSPITAL | Age: 35
End: 2020-05-01

## 2020-05-01 DIAGNOSIS — O26.90 PREGNANCY, ANTEPARTUM, COMPLICATIONS: ICD-10-CM

## 2020-05-04 ENCOUNTER — OFFICE VISIT - HEALTHEAST (OUTPATIENT)
Dept: MATERNAL FETAL MEDICINE | Facility: HOSPITAL | Age: 35
End: 2020-05-04

## 2020-05-04 ENCOUNTER — COMMUNICATION - HEALTHEAST (OUTPATIENT)
Dept: MIDWIFE SERVICES | Facility: CLINIC | Age: 35
End: 2020-05-04

## 2020-05-04 DIAGNOSIS — O26.90 PREGNANCY, ANTEPARTUM, COMPLICATIONS: ICD-10-CM

## 2020-05-04 DIAGNOSIS — O09.512 SUPERVISION OF ELDERLY PRIMIGRAVIDA IN SECOND TRIMESTER: ICD-10-CM

## 2020-05-08 ENCOUNTER — RECORDS - HEALTHEAST (OUTPATIENT)
Dept: ULTRASOUND IMAGING | Facility: HOSPITAL | Age: 35
End: 2020-05-08

## 2020-05-08 ENCOUNTER — COMMUNICATION - HEALTHEAST (OUTPATIENT)
Dept: OBGYN | Facility: CLINIC | Age: 35
End: 2020-05-08

## 2020-05-08 ENCOUNTER — RECORDS - HEALTHEAST (OUTPATIENT)
Dept: ADMINISTRATIVE | Facility: OTHER | Age: 35
End: 2020-05-08

## 2020-05-08 ENCOUNTER — OFFICE VISIT - HEALTHEAST (OUTPATIENT)
Dept: MATERNAL FETAL MEDICINE | Facility: HOSPITAL | Age: 35
End: 2020-05-08

## 2020-05-08 ENCOUNTER — AMBULATORY - HEALTHEAST (OUTPATIENT)
Dept: LAB | Facility: CLINIC | Age: 35
End: 2020-05-08

## 2020-05-08 DIAGNOSIS — O34.42 HISTORY OF CERVICAL LEEP BIOPSY AFFECTING CARE OF MOTHER, ANTEPARTUM, SECOND TRIMESTER: ICD-10-CM

## 2020-05-08 DIAGNOSIS — O26.90 PREGNANCY RELATED CONDITIONS, UNSPECIFIED, UNSPECIFIED TRIMESTER: ICD-10-CM

## 2020-05-08 DIAGNOSIS — O09.512 AMA (ADVANCED MATERNAL AGE) PRIMIGRAVIDA 35+, SECOND TRIMESTER: ICD-10-CM

## 2020-05-08 DIAGNOSIS — O09.519 SUPERVISION OF HIGH-RISK PREGNANCY OF ELDERLY PRIMIGRAVIDA: ICD-10-CM

## 2020-05-08 DIAGNOSIS — Z13.79 GENETIC SCREENING: ICD-10-CM

## 2020-05-09 ENCOUNTER — AMBULATORY - HEALTHEAST (OUTPATIENT)
Dept: OBGYN | Facility: HOSPITAL | Age: 35
End: 2020-05-09

## 2020-05-11 LAB
# FETUSES US: NORMAL
AFP MOM SERPL: 1.88
AFP SERPL-MCNC: 103 NG/ML
AGE - REPORTED: 37.1 YR
CURRENT SMOKER: NO
FAMILY MEMBER DISEASES HX: NO
GA METHOD: NORMAL
GA: NORMAL WK
IDDM PATIENT QL: NO
INTEGRATED SCN PATIENT-IMP: NORMAL
SPECIMEN DRAWN SERPL: NORMAL

## 2020-05-12 ENCOUNTER — AMBULATORY - HEALTHEAST (OUTPATIENT)
Dept: MIDWIFE SERVICES | Facility: CLINIC | Age: 35
End: 2020-05-12

## 2020-06-18 ENCOUNTER — COMMUNICATION - HEALTHEAST (OUTPATIENT)
Dept: ADMINISTRATIVE | Facility: CLINIC | Age: 35
End: 2020-06-18

## 2020-06-25 ENCOUNTER — PRENATAL OFFICE VISIT - HEALTHEAST (OUTPATIENT)
Dept: MIDWIFE SERVICES | Facility: CLINIC | Age: 35
End: 2020-06-25

## 2020-06-25 ENCOUNTER — AMBULATORY - HEALTHEAST (OUTPATIENT)
Dept: MIDWIFE SERVICES | Facility: CLINIC | Age: 35
End: 2020-06-25

## 2020-06-25 ENCOUNTER — COMMUNICATION - HEALTHEAST (OUTPATIENT)
Dept: MIDWIFE SERVICES | Facility: CLINIC | Age: 35
End: 2020-06-25

## 2020-06-25 DIAGNOSIS — O09.519 SUPERVISION OF HIGH-RISK PREGNANCY OF ELDERLY PRIMIGRAVIDA: ICD-10-CM

## 2020-06-25 DIAGNOSIS — R73.09 ELEVATED GLUCOSE TOLERANCE TEST: ICD-10-CM

## 2020-06-25 LAB
FASTING STATUS PATIENT QL REPORTED: NO
GLUCOSE 1H P 50 G GLC PO SERPL-MCNC: 160 MG/DL (ref 70–139)
HGB BLD-MCNC: 11.4 G/DL (ref 12–16)

## 2020-06-25 ASSESSMENT — MIFFLIN-ST. JEOR: SCORE: 1361.16

## 2020-06-26 ENCOUNTER — AMBULATORY - HEALTHEAST (OUTPATIENT)
Dept: LAB | Facility: CLINIC | Age: 35
End: 2020-06-26

## 2020-06-26 DIAGNOSIS — R73.09 ELEVATED GLUCOSE TOLERANCE TEST: ICD-10-CM

## 2020-06-26 LAB
FASTING STATUS PATIENT QL REPORTED: YES
GLUCOSE 1H P 100 G GLC PO SERPL-MCNC: 161 MG/DL
GLUCOSE 2H P 100 G GLC PO SERPL-MCNC: 130 MG/DL
GLUCOSE 3H P 100 G GLC PO SERPL-MCNC: 115 MG/DL
GLUCOSE P FAST SERPL-MCNC: 64 MG/DL
T PALLIDUM AB SER QL: NEGATIVE

## 2020-07-13 ENCOUNTER — COMMUNICATION - HEALTHEAST (OUTPATIENT)
Dept: OBGYN | Facility: HOSPITAL | Age: 35
End: 2020-07-13

## 2020-07-13 ENCOUNTER — COMMUNICATION - HEALTHEAST (OUTPATIENT)
Dept: MIDWIFE SERVICES | Facility: CLINIC | Age: 35
End: 2020-07-13

## 2020-07-13 ENCOUNTER — COMMUNICATION - HEALTHEAST (OUTPATIENT)
Dept: ADMINISTRATIVE | Facility: CLINIC | Age: 35
End: 2020-07-13

## 2020-07-13 ENCOUNTER — AMBULATORY - HEALTHEAST (OUTPATIENT)
Dept: MIDWIFE SERVICES | Facility: CLINIC | Age: 35
End: 2020-07-13

## 2020-07-13 ENCOUNTER — PRENATAL OFFICE VISIT - HEALTHEAST (OUTPATIENT)
Dept: MIDWIFE SERVICES | Facility: CLINIC | Age: 35
End: 2020-07-13

## 2020-07-13 DIAGNOSIS — O09.519 SUPERVISION OF HIGH-RISK PREGNANCY OF ELDERLY PRIMIGRAVIDA: ICD-10-CM

## 2020-07-13 DIAGNOSIS — N30.00 ACUTE CYSTITIS WITHOUT HEMATURIA: ICD-10-CM

## 2020-07-13 DIAGNOSIS — R30.0 DYSURIA: ICD-10-CM

## 2020-07-13 LAB
ALBUMIN UR-MCNC: NEGATIVE MG/DL
APPEARANCE UR: CLEAR
BACTERIA #/AREA URNS HPF: ABNORMAL HPF
BILIRUB UR QL STRIP: NEGATIVE
COLOR UR AUTO: YELLOW
GLUCOSE UR STRIP-MCNC: NEGATIVE MG/DL
HGB UR QL STRIP: NEGATIVE
KETONES UR STRIP-MCNC: ABNORMAL MG/DL
LEUKOCYTE ESTERASE UR QL STRIP: NEGATIVE
NITRATE UR QL: NEGATIVE
PH UR STRIP: 6 [PH] (ref 5–8)
RBC #/AREA URNS AUTO: ABNORMAL HPF
SP GR UR STRIP: >=1.03 (ref 1–1.03)
SQUAMOUS #/AREA URNS AUTO: ABNORMAL LPF
UROBILINOGEN UR STRIP-ACNC: ABNORMAL
WBC #/AREA URNS AUTO: ABNORMAL HPF

## 2020-07-13 ASSESSMENT — MIFFLIN-ST. JEOR: SCORE: 1396.09

## 2020-07-14 LAB — BACTERIA SPEC CULT: NO GROWTH

## 2020-07-31 ENCOUNTER — PRENATAL OFFICE VISIT - HEALTHEAST (OUTPATIENT)
Dept: FAMILY MEDICINE | Facility: CLINIC | Age: 35
End: 2020-07-31

## 2020-07-31 DIAGNOSIS — G43.709 CHRONIC MIGRAINE WITHOUT AURA WITHOUT STATUS MIGRAINOSUS, NOT INTRACTABLE: ICD-10-CM

## 2020-07-31 DIAGNOSIS — R00.2 HEART PALPITATIONS: ICD-10-CM

## 2020-07-31 DIAGNOSIS — Z91.89 AT RISK FOR COMPLICATION OF PREGNANCY: ICD-10-CM

## 2020-07-31 DIAGNOSIS — A60.00 GENITAL HERPES SIMPLEX, UNSPECIFIED SITE: ICD-10-CM

## 2020-07-31 DIAGNOSIS — O09.519 SUPERVISION OF HIGH-RISK PREGNANCY OF ELDERLY PRIMIGRAVIDA: ICD-10-CM

## 2020-07-31 DIAGNOSIS — N30.00 ACUTE CYSTITIS WITHOUT HEMATURIA: ICD-10-CM

## 2020-07-31 LAB
ALBUMIN UR-MCNC: NEGATIVE MG/DL
GLUCOSE UR STRIP-MCNC: NEGATIVE MG/DL
KETONES UR STRIP-MCNC: NEGATIVE MG/DL

## 2020-08-14 ENCOUNTER — PRENATAL OFFICE VISIT - HEALTHEAST (OUTPATIENT)
Dept: FAMILY MEDICINE | Facility: CLINIC | Age: 35
End: 2020-08-14

## 2020-08-14 DIAGNOSIS — O09.519 SUPERVISION OF HIGH-RISK PREGNANCY OF ELDERLY PRIMIGRAVIDA: ICD-10-CM

## 2020-08-14 DIAGNOSIS — A60.00 GENITAL HERPES SIMPLEX, UNSPECIFIED SITE: ICD-10-CM

## 2020-08-31 ENCOUNTER — PRENATAL OFFICE VISIT - HEALTHEAST (OUTPATIENT)
Dept: FAMILY MEDICINE | Facility: CLINIC | Age: 35
End: 2020-08-31

## 2020-08-31 DIAGNOSIS — L82.1 SEBORRHEIC KERATOSES: ICD-10-CM

## 2020-08-31 DIAGNOSIS — R10.11 RUQ ABDOMINAL PAIN: ICD-10-CM

## 2020-08-31 DIAGNOSIS — A60.00 GENITAL HERPES SIMPLEX, UNSPECIFIED SITE: ICD-10-CM

## 2020-08-31 DIAGNOSIS — O09.519 SUPERVISION OF HIGH-RISK PREGNANCY OF ELDERLY PRIMIGRAVIDA: ICD-10-CM

## 2020-08-31 LAB
ALBUMIN SERPL-MCNC: 2.8 G/DL (ref 3.5–5)
ALBUMIN UR-MCNC: ABNORMAL MG/DL
ALP SERPL-CCNC: 147 U/L (ref 45–120)
ALT SERPL W P-5'-P-CCNC: 11 U/L (ref 0–45)
AST SERPL W P-5'-P-CCNC: 14 U/L (ref 0–40)
BILIRUB DIRECT SERPL-MCNC: <0.1 MG/DL
BILIRUB SERPL-MCNC: 0.3 MG/DL (ref 0–1)
GLUCOSE UR STRIP-MCNC: NEGATIVE MG/DL
KETONES UR STRIP-MCNC: ABNORMAL MG/DL
PROT SERPL-MCNC: 6.3 G/DL (ref 6–8)

## 2020-09-01 ENCOUNTER — COMMUNICATION - HEALTHEAST (OUTPATIENT)
Dept: FAMILY MEDICINE | Facility: CLINIC | Age: 35
End: 2020-09-01

## 2020-09-01 LAB
ALLERGIC TO PENICILLIN: YES
GP B STREP DNA SPEC QL NAA+PROBE: NEGATIVE

## 2020-09-03 ENCOUNTER — COMMUNICATION - HEALTHEAST (OUTPATIENT)
Dept: FAMILY MEDICINE | Facility: CLINIC | Age: 35
End: 2020-09-03

## 2020-09-09 ENCOUNTER — PRENATAL OFFICE VISIT - HEALTHEAST (OUTPATIENT)
Dept: FAMILY MEDICINE | Facility: CLINIC | Age: 35
End: 2020-09-09

## 2020-09-09 DIAGNOSIS — O09.519 SUPERVISION OF HIGH-RISK PREGNANCY OF ELDERLY PRIMIGRAVIDA: ICD-10-CM

## 2020-09-09 DIAGNOSIS — A60.00 GENITAL HERPES SIMPLEX, UNSPECIFIED SITE: ICD-10-CM

## 2020-09-14 ENCOUNTER — PRENATAL OFFICE VISIT - HEALTHEAST (OUTPATIENT)
Dept: FAMILY MEDICINE | Facility: CLINIC | Age: 35
End: 2020-09-14

## 2020-09-14 DIAGNOSIS — Z34.03 SUPERVISION OF NORMAL FIRST PREGNANCY IN THIRD TRIMESTER: ICD-10-CM

## 2020-09-14 LAB
ALBUMIN UR-MCNC: ABNORMAL MG/DL
CREAT UR-MCNC: 179 MG/DL
GLUCOSE UR STRIP-MCNC: NEGATIVE MG/DL
KETONES UR STRIP-MCNC: ABNORMAL MG/DL
PROTEIN, RANDOM URINE - HISTORICAL: 17 MG/DL
PROTEIN/CREAT RATIO, RANDOM UR: 0.09

## 2020-09-18 ENCOUNTER — COMMUNICATION - HEALTHEAST (OUTPATIENT)
Dept: FAMILY MEDICINE | Facility: CLINIC | Age: 35
End: 2020-09-18

## 2020-09-22 ENCOUNTER — PRENATAL OFFICE VISIT - HEALTHEAST (OUTPATIENT)
Dept: FAMILY MEDICINE | Facility: CLINIC | Age: 35
End: 2020-09-22

## 2020-09-22 DIAGNOSIS — O09.519 SUPERVISION OF HIGH-RISK PREGNANCY OF ELDERLY PRIMIGRAVIDA: ICD-10-CM

## 2020-09-27 ENCOUNTER — ANESTHESIA - HEALTHEAST (OUTPATIENT)
Dept: OBGYN | Facility: HOSPITAL | Age: 35
End: 2020-09-27

## 2020-09-28 ENCOUNTER — COMMUNICATION - HEALTHEAST (OUTPATIENT)
Dept: SCHEDULING | Facility: CLINIC | Age: 35
End: 2020-09-28

## 2020-09-29 ENCOUNTER — HOME CARE/HOSPICE - HEALTHEAST (OUTPATIENT)
Dept: HOME HEALTH SERVICES | Facility: HOME HEALTH | Age: 35
End: 2020-09-29

## 2020-10-01 ENCOUNTER — COMMUNICATION - HEALTHEAST (OUTPATIENT)
Dept: FAMILY MEDICINE | Facility: CLINIC | Age: 35
End: 2020-10-01

## 2020-10-01 ENCOUNTER — HOME CARE/HOSPICE - HEALTHEAST (OUTPATIENT)
Dept: HOME HEALTH SERVICES | Facility: HOME HEALTH | Age: 35
End: 2020-10-01

## 2020-10-02 ENCOUNTER — COMMUNICATION - HEALTHEAST (OUTPATIENT)
Dept: FAMILY MEDICINE | Facility: CLINIC | Age: 35
End: 2020-10-02

## 2020-10-06 ENCOUNTER — AMBULATORY - HEALTHEAST (OUTPATIENT)
Dept: PEDIATRICS | Facility: CLINIC | Age: 35
End: 2020-10-06

## 2020-10-07 ENCOUNTER — COMMUNICATION - HEALTHEAST (OUTPATIENT)
Dept: FAMILY MEDICINE | Facility: CLINIC | Age: 35
End: 2020-10-07

## 2020-10-08 ENCOUNTER — COMMUNICATION - HEALTHEAST (OUTPATIENT)
Dept: SCHEDULING | Facility: CLINIC | Age: 35
End: 2020-10-08

## 2020-10-08 ENCOUNTER — PRENATAL OFFICE VISIT - HEALTHEAST (OUTPATIENT)
Dept: FAMILY MEDICINE | Facility: CLINIC | Age: 35
End: 2020-10-08

## 2020-10-08 ENCOUNTER — COMMUNICATION - HEALTHEAST (OUTPATIENT)
Dept: FAMILY MEDICINE | Facility: CLINIC | Age: 35
End: 2020-10-08

## 2020-10-08 DIAGNOSIS — D62 ANEMIA DUE TO BLOOD LOSS, ACUTE: ICD-10-CM

## 2020-10-08 LAB
ALBUMIN SERPL-MCNC: 3.4 G/DL (ref 3.5–5)
ALBUMIN UR-MCNC: NEGATIVE MG/DL
ALP SERPL-CCNC: 101 U/L (ref 45–120)
ALT SERPL W P-5'-P-CCNC: 29 U/L (ref 0–45)
ANION GAP SERPL CALCULATED.3IONS-SCNC: 10 MMOL/L (ref 5–18)
AST SERPL W P-5'-P-CCNC: 24 U/L (ref 0–40)
BILIRUB SERPL-MCNC: 0.2 MG/DL (ref 0–1)
BUN SERPL-MCNC: 15 MG/DL (ref 8–22)
CALCIUM SERPL-MCNC: 9.1 MG/DL (ref 8.5–10.5)
CHLORIDE BLD-SCNC: 105 MMOL/L (ref 98–107)
CO2 SERPL-SCNC: 26 MMOL/L (ref 22–31)
CREAT SERPL-MCNC: 0.73 MG/DL (ref 0.6–1.1)
ERYTHROCYTE [DISTWIDTH] IN BLOOD BY AUTOMATED COUNT: 13.3 % (ref 11–14.5)
GFR SERPL CREATININE-BSD FRML MDRD: >60 ML/MIN/1.73M2
GLUCOSE BLD-MCNC: 55 MG/DL (ref 70–125)
GLUCOSE UR STRIP-MCNC: NEGATIVE MG/DL
HCT VFR BLD AUTO: 33.7 % (ref 35–47)
HGB BLD-MCNC: 11 G/DL (ref 12–16)
KETONES UR STRIP-MCNC: NEGATIVE MG/DL
MCH RBC QN AUTO: 29.3 PG (ref 27–34)
MCHC RBC AUTO-ENTMCNC: 32.8 G/DL (ref 32–36)
MCV RBC AUTO: 89 FL (ref 80–100)
PLATELET # BLD AUTO: 713 THOU/UL (ref 140–440)
PMV BLD AUTO: 6.7 FL (ref 7–10)
POTASSIUM BLD-SCNC: 5.2 MMOL/L (ref 3.5–5)
PROT SERPL-MCNC: 6.7 G/DL (ref 6–8)
RBC # BLD AUTO: 3.76 MILL/UL (ref 3.8–5.4)
SODIUM SERPL-SCNC: 141 MMOL/L (ref 136–145)
WBC: 9.3 THOU/UL (ref 4–11)

## 2020-10-09 ENCOUNTER — COMMUNICATION - HEALTHEAST (OUTPATIENT)
Dept: FAMILY MEDICINE | Facility: CLINIC | Age: 35
End: 2020-10-09

## 2020-10-09 ENCOUNTER — AMBULATORY - HEALTHEAST (OUTPATIENT)
Dept: FAMILY MEDICINE | Facility: CLINIC | Age: 35
End: 2020-10-09

## 2020-10-09 DIAGNOSIS — R63.39 DIFFICULTY IN FEEDING AT BREAST: ICD-10-CM

## 2020-10-12 ENCOUNTER — COMMUNICATION - HEALTHEAST (OUTPATIENT)
Dept: FAMILY MEDICINE | Facility: CLINIC | Age: 35
End: 2020-10-12

## 2020-10-16 ENCOUNTER — COMMUNICATION - HEALTHEAST (OUTPATIENT)
Dept: FAMILY MEDICINE | Facility: CLINIC | Age: 35
End: 2020-10-16

## 2020-11-02 ENCOUNTER — COMMUNICATION - HEALTHEAST (OUTPATIENT)
Dept: FAMILY MEDICINE | Facility: CLINIC | Age: 35
End: 2020-11-02

## 2020-11-02 DIAGNOSIS — Z72.51 UNPROTECTED SEX: ICD-10-CM

## 2020-11-09 ENCOUNTER — OFFICE VISIT - HEALTHEAST (OUTPATIENT)
Dept: FAMILY MEDICINE | Facility: CLINIC | Age: 35
End: 2020-11-09

## 2020-11-09 ENCOUNTER — COMMUNICATION - HEALTHEAST (OUTPATIENT)
Dept: FAMILY MEDICINE | Facility: CLINIC | Age: 35
End: 2020-11-09

## 2020-11-09 DIAGNOSIS — D62 ANEMIA DUE TO BLOOD LOSS, ACUTE: ICD-10-CM

## 2020-11-09 DIAGNOSIS — Z12.4 SCREENING FOR CERVICAL CANCER: ICD-10-CM

## 2020-11-09 DIAGNOSIS — Z30.011 ORAL CONTRACEPTION INITIAL PRESCRIPTION: ICD-10-CM

## 2020-11-09 LAB
BASOPHILS # BLD AUTO: 0 THOU/UL (ref 0–0.2)
BASOPHILS NFR BLD AUTO: 0 % (ref 0–2)
EOSINOPHIL # BLD AUTO: 0.2 THOU/UL (ref 0–0.4)
EOSINOPHIL NFR BLD AUTO: 3 % (ref 0–6)
ERYTHROCYTE [DISTWIDTH] IN BLOOD BY AUTOMATED COUNT: 12.4 % (ref 11–14.5)
HCT VFR BLD AUTO: 37.2 % (ref 35–47)
HGB BLD-MCNC: 12 G/DL (ref 12–16)
LYMPHOCYTES # BLD AUTO: 3.3 THOU/UL (ref 0.8–4.4)
LYMPHOCYTES NFR BLD AUTO: 39 % (ref 20–40)
MCH RBC QN AUTO: 28.2 PG (ref 27–34)
MCHC RBC AUTO-ENTMCNC: 32.2 G/DL (ref 32–36)
MCV RBC AUTO: 88 FL (ref 80–100)
MONOCYTES # BLD AUTO: 0.6 THOU/UL (ref 0–0.9)
MONOCYTES NFR BLD AUTO: 7 % (ref 2–10)
NEUTROPHILS # BLD AUTO: 4.3 THOU/UL (ref 2–7.7)
NEUTROPHILS NFR BLD AUTO: 50 % (ref 50–70)
PLATELET # BLD AUTO: 317 THOU/UL (ref 140–440)
PMV BLD AUTO: 7.8 FL (ref 7–10)
RBC # BLD AUTO: 4.25 MILL/UL (ref 3.8–5.4)
WBC: 8.5 THOU/UL (ref 4–11)

## 2020-11-10 LAB
HPV SOURCE: NORMAL
HUMAN PAPILLOMA VIRUS 16 DNA: NEGATIVE
HUMAN PAPILLOMA VIRUS 18 DNA: NEGATIVE
HUMAN PAPILLOMA VIRUS FINAL DIAGNOSIS: NORMAL
HUMAN PAPILLOMA VIRUS OTHER HR: NEGATIVE
SPECIMEN DESCRIPTION: NORMAL

## 2020-12-27 ENCOUNTER — HEALTH MAINTENANCE LETTER (OUTPATIENT)
Age: 35
End: 2020-12-27

## 2021-02-12 ENCOUNTER — COMMUNICATION - HEALTHEAST (OUTPATIENT)
Dept: FAMILY MEDICINE | Facility: CLINIC | Age: 36
End: 2021-02-12

## 2021-03-02 ENCOUNTER — VIRTUAL VISIT (OUTPATIENT)
Dept: DERMATOLOGY | Facility: CLINIC | Age: 36
End: 2021-03-02
Payer: COMMERCIAL

## 2021-03-02 DIAGNOSIS — B07.8 VERRUCA PLANA: Primary | ICD-10-CM

## 2021-03-02 PROCEDURE — 99214 OFFICE O/P EST MOD 30 MIN: CPT | Mod: 95 | Performed by: DERMATOLOGY

## 2021-03-02 RX ORDER — FLUOROURACIL 50 MG/G
CREAM TOPICAL
Qty: 40 G | Refills: 1 | Status: SHIPPED | OUTPATIENT
Start: 2021-03-02 | End: 2021-04-08

## 2021-03-02 ASSESSMENT — PAIN SCALES - GENERAL: PAINLEVEL: NO PAIN (0)

## 2021-03-02 NOTE — PROGRESS NOTES
[facility] Dermatology Note  Encounter Date: Mar 2, 2021  Store-and-Forward and Telephone ([phone]). Location of teledermatologist: [facility] DERMATOLOGY CLINIC [place].  Start time: 11:45. End time: 12:00.    Dermatology Problem List:  1. Verruca plana, right cheek and jaw line  - Past tx: 5-FU, imiquimod   - Current tx: Effudex BID for 1 month (start 3/2/21)    ____________________________________________    Assessment & Plan:     # Verruca Plana, Patient no longer pregnant or breast feeding. Discussed treatment options with patient, including Efudex and isotretinoin, which are not safe during pregnancy. Pt agreed to try treatment with Effudex for the next month. Advised patient to take breaks if the skin becomes too red/swollen/painful, although a little bit of redness and swelling is ok. Should use contraception during this time and for four weeks after treatment finishes to avoid becoming pregnant.  - Start Effudex BID for 1 month  - Follow up in 1 month; can consider isotretinoin or other alternatives if not improved at this point.      Procedures Performed:    None    Follow-up: 1 month(s) in-person, or earlier for new or changing lesions    Staff and Medical Student:       Earle Jon, MS4  [facility]    I participated in the entirety of the interview, reviewed all available images, and agree with the assessment and plan as documented in the medical student's note.  I personally performed all medical decision making.    Chad Le MD  Dermatology Attending    ____________________________________________    CC: Derm Problem (Irasema is rechecking warts on her face. It was originally on the right and now is also on the left.)    HPI:  Ms. Irasema Warner is a(n) 35 year old female who presents today for follow-up  for verruca plana.    The patient was seen on 1/28/2019 when she had a biopsy of the lesions on her right face before, which came back as  verruca plana. She was last seen on 2/4/20. At that time she had learned of she was possibly pregnant, and we held off on treatments until after she completed her term and ultimately finished breast feeding.     Since this time, she gave birth to her baby boy and has completed breast feeding. She reports that the warts are still present on her right cheek and chin. She feels they are spreading, and is starting to notice them on her left cheek. They are still pruritic but she avoids scratching them. No associated pain or bleeding. She has started using salicylic acid cream in the last couple of months, but states it burns upon application. She has used it sparingly due to the burning.     Patient is otherwise feeling well, without additional skin concerns.    Labs Reviewed:  N/A    Physical Exam:  Vitals: There were no vitals taken for this visit.  SKIN: Teledermatology photos were reviewed; image quality and interpretability: acceptable. Image upload date: 2/24/21.  - Along right cheek and jaw line there are many scattered, 2 mm flat-topped, pink-brown ovoid papules with fine scale.  - No other lesions of concern on areas examined.     Medications:  Current Outpatient Medications   Medication     azelaic acid (FINACIA) 15 % external gel     CYCLOBENZAPRINE HCL PO     ibuprofen (ADVIL/MOTRIN) 600 MG tablet     order for DME     desogestrel-ethinyl estradiol (APRI) 0.15-30 MG-MCG per tablet     hydrOXYzine (ATARAX) 25 MG tablet     LORazepam (ATIVAN) 0.5 MG tablet     METFORMIN HCL PO     metoprolol (TOPROL-XL) 25 MG 24 hr tablet     nortriptyline (PAMELOR) 10 MG capsule     Salicylic Acid 10 % CREA     Current Facility-Administered Medications   Medication     lidocaine 1% with EPINEPHrine 1:100,000 injection 3 mL      Past Medical/Surgical History:   Patient Active Problem List   Diagnosis     Migraine     Hypertension     Rash     Past Medical History:   Diagnosis Date     Epilepsy posttraumatic (H)       Hyperinsulinemia        CC No referring provider defined for this encounter. on close of this encounter.

## 2021-03-02 NOTE — PATIENT INSTRUCTIONS
[facility] Dermatology Visit    Thank you for allowing us to participate in your care. Your findings, instructions and follow-up plan are as follows:         When should I call my doctor?    If you are worsening or not improving, please, contact us or seek urgent care as noted below.     Who should I call with questions (adults)?    [facility] (adult and pediatric): [phone]     [facility] (adult): [phone]    For urgent needs outside of business hours call the [facility] at [phone] and ask for the dermatology resident on call    If this is a medical emergency and you are unable to reach an ER, Call 911      Who should I call with questions (pediatric)?  [facility]- Pediatric Dermatology  Dr. Mai Dewitt, Dr. Charlotte Wynn, Dr. Joana Guadalupe, Wendy Cohen, PA  Dr. Mary Mckee, Dr. Tara Bal & Dr. Naveed Edgar  Non Urgent  Nurse Triage Line; [phone]- Trinidad and Gissel RN Care Coordinators   Lynn (/Complex ) [phone]    If you need a prescription refill, please contact your pharmacy. Refills are approved or denied by our Physicians during normal business hours, Monday through Fridays  Per office policy, refills will not be granted if you have not been seen within the past year (or sooner depending on your child's condition)    Scheduling Information:  Pediatric Appointment Scheduling and Call Center [phone]  Radiology Scheduling- [phone]  Sedation Unit Scheduling- [phone]  [facility] Scheduling- General [phone]; Pediatric Dermatology [phone]  Main  Services: [phone]  Armenian: [phone]  Macanese: [phone]  Hmong/Malay/Yoruba: [phone]  Preadmission Nursing Department Fax Number: [phone] (Fax all pre-operative paperwork to this number)    For urgent matters arising during evenings,  weekends, or holidays that cannot wait for normal business hours please call [phone] and ask for the Dermatology Resident On-Call to be paged.

## 2021-03-02 NOTE — LETTER
3/2/2021       RE: Irasema Warner  [address] [place] [id number]     Dear Colleague,    Thank you for referring your patient, Irasema Warner, to the [facility] DERMATOLOGY CLINIC [place] at [facility]. Please see a copy of my visit note below.    [facility] Dermatology Note  Encounter Date: Mar 2, 2021  Store-and-Forward and Telephone ([phone]). Location of teledermatologist: [facility] DERMATOLOGY CLINIC [place].  Start time: 11:45. End time: 12:00.    Dermatology Problem List:  1. Verruca plana, right cheek and jaw line  - Past tx: 5-FU, imiquimod   - Current tx: Effudex BID for 1 month (start 3/2/21)    ____________________________________________    Assessment & Plan:     # Verruca Plana, Patient no longer pregnant or breast feeding. Discussed treatment options with patient, including Efudex and isotretinoin, which are not safe during pregnancy. Pt agreed to try treatment with Effudex for the next month. Advised patient to take breaks if the skin becomes too red/swollen/painful, although a little bit of redness and swelling is ok. Should use contraception during this time and for four weeks after treatment finishes to avoid becoming pregnant.  - Start Effudex BID for 1 month  - Follow up in 1 month; can consider isotretinoin or other alternatives if not improved at this point.      Procedures Performed:    None    Follow-up: 1 month(s) in-person, or earlier for new or changing lesions    Staff and Medical Student:       Earle Jon, MS4  [facility]    I participated in the entirety of the interview, reviewed all available images, and agree with the assessment and plan as documented in the medical student's note.  I personally performed all medical decision making.    Chad Le MD  Dermatology Attending    ____________________________________________    CC: Derm Problem  (Irasema is rechecking warts on her face. It was originally on the right and now is also on the left.)    HPI:  Ms. Irasema Warner is a(n) 35 year old female who presents today for follow-up  for verruca plana.    The patient was seen on 1/28/2019 when she had a biopsy of the lesions on her right face before, which came back as verruca plana. She was last seen on 2/4/20. At that time she had learned of she was possibly pregnant, and we held off on treatments until after she completed her term and ultimately finished breast feeding.     Since this time, she gave birth to her baby boy and has completed breast feeding. She reports that the warts are still present on her right cheek and chin. She feels they are spreading, and is starting to notice them on her left cheek. They are still pruritic but she avoids scratching them. No associated pain or bleeding. She has started using salicylic acid cream in the last couple of months, but states it burns upon application. She has used it sparingly due to the burning.     Patient is otherwise feeling well, without additional skin concerns.    Labs Reviewed:  N/A    Physical Exam:  Vitals: There were no vitals taken for this visit.  SKIN: Teledermatology photos were reviewed; image quality and interpretability: acceptable. Image upload date: 2/24/21.  - Along right cheek and jaw line there are many scattered, 2 mm flat-topped, pink-brown ovoid papules with fine scale.  - No other lesions of concern on areas examined.     Medications:  Current Outpatient Medications   Medication     azelaic acid (FINACIA) 15 % external gel     CYCLOBENZAPRINE HCL PO     ibuprofen (ADVIL/MOTRIN) 600 MG tablet     order for DME     desogestrel-ethinyl estradiol (APRI) 0.15-30 MG-MCG per tablet     hydrOXYzine (ATARAX) 25 MG tablet     LORazepam (ATIVAN) 0.5 MG tablet     METFORMIN HCL PO     metoprolol (TOPROL-XL) 25 MG 24 hr tablet     nortriptyline (PAMELOR) 10 MG capsule     Salicylic  Acid 10 % CREA     Current Facility-Administered Medications   Medication     lidocaine 1% with EPINEPHrine 1:100,000 injection 3 mL      Past Medical/Surgical History:   Patient Active Problem List   Diagnosis     Migraine     Hypertension     Rash     Past Medical History:   Diagnosis Date     Epilepsy posttraumatic (H)      Hyperinsulinemia        CC No referring provider defined for this encounter. on close of this encounter.

## 2021-03-02 NOTE — NURSING NOTE
Dermatology Rooming Note    Irasema Warner's goals for this visit include:   Chief Complaint   Patient presents with     Derm Problem     Irasema is rechecking warts on her face. It was originally on the right and now is also on the left.     Sonal Isabel, [facility]

## 2021-03-18 ENCOUNTER — COMMUNICATION - HEALTHEAST (OUTPATIENT)
Dept: FAMILY MEDICINE | Facility: CLINIC | Age: 36
End: 2021-03-18

## 2021-03-28 PROBLEM — B07.8 VERRUCA PLANA: Status: ACTIVE | Noted: 2021-03-28

## 2021-04-08 ENCOUNTER — OFFICE VISIT (OUTPATIENT)
Dept: DERMATOLOGY | Facility: CLINIC | Age: 36
End: 2021-04-08
Payer: COMMERCIAL

## 2021-04-08 DIAGNOSIS — B07.8 VERRUCA PLANA: ICD-10-CM

## 2021-04-08 DIAGNOSIS — D22.9 NEVUS: Primary | ICD-10-CM

## 2021-04-08 PROCEDURE — 99213 OFFICE O/P EST LOW 20 MIN: CPT | Mod: GC | Performed by: DERMATOLOGY

## 2021-04-08 RX ORDER — FLUOROURACIL 50 MG/G
CREAM TOPICAL
Qty: 40 G | Refills: 1 | Status: SHIPPED | OUTPATIENT
Start: 2021-04-08 | End: 2021-09-14

## 2021-04-08 ASSESSMENT — PAIN SCALES - GENERAL: PAINLEVEL: NO PAIN (0)

## 2021-04-08 NOTE — PROGRESS NOTES
[facility] Dermatology Note  Encounter Date: Apr 8, 2021  Office Visit     Dermatology Problem List:  1. Verruca plana, biopsy proven. Mainly involving right cheek and jawline  - Past tx: Efudex, imiquimod  - Current tx: Efudex 2-3 times per week  - Future: oral isotretinoin    ____________________________________________    Assessment & Plan:   # Verruca plana, biopsy proven. Does appear improved s/p 1 month of Efudex cream daily now with concomitant post-inflammatory hyperpigmentation.  - Given positive response, recommend continuing Efudex cream two to three days a week for the next 2-3 months until recheck.  - If still active at follow-up, will consider starting isotretinoin     # Nevus, right breast  - Reviewed benign appearance today and that moles commonly change during pregnancy  - Offered shave removal if ever bothersome in the future      Procedures Performed: None    Follow-up: 3 month(s) in-person to recheck verruca plana, or earlier for new or changing lesions    Staff and Resident:     Danielle Arizmendi MD  PGY-4, Dermatology      Chad Le MD  Dermatology Attending    ____________________________________________    CC: RECHECK (pt states she is here for a follow up on her warts right side of face) and Mole (upper right said of pt chest X pt states the mole has changed colors )    HPI:  Ms. Irasema Warner is a(n) 35 year old female who presents today as a return patient for verruca plana. She was last seen as a virtual visit on 3/2/2021. Since the last visit, she has been using the Efudex once a day. She cannot use it twice a day because she tries not to touch her baby boy when she puts it on. She had been using the Efudex regularly until this [place]. She did get a sunburn outdoors, so she stopped using the Efudex for the last few days.    Also is hoping to have a mole on her right breast checked. Has been  "there for years but changed during pregnancy, \"grew tentacles out of it\". Denies any pain or bleeding.    Patient is otherwise feeling well, without additional skin concerns.    Labs Reviewed:  N/A    Physical Exam:  Vitals: There were no vitals taken for this visit.  SKIN: Focused examination of face was performed.  - Pink to brown hyperpigmented circular small macules on the right jawline and lower cheek.  - Hyperpigmented patch on the left cheek  - Approximately 6mm brown papule on the right breast  - No other lesions of concern on areas examined.     Medications:  Current Outpatient Medications   Medication     azelaic acid (FINACIA) 15 % external gel     desogestrel-ethinyl estradiol (APRI) 0.15-30 MG-MCG per tablet     fluorouracil (EFUDEX) 5 % external cream     ibuprofen (ADVIL/MOTRIN) 600 MG tablet     METFORMIN HCL PO     nortriptyline (PAMELOR) 10 MG capsule     Salicylic Acid 10 % CREA     CYCLOBENZAPRINE HCL PO     hydrOXYzine (ATARAX) 25 MG tablet     LORazepam (ATIVAN) 0.5 MG tablet     metoprolol (TOPROL-XL) 25 MG 24 hr tablet     order for DME     Current Facility-Administered Medications   Medication     lidocaine 1% with EPINEPHrine 1:100,000 injection 3 mL      Past Medical History:   Patient Active Problem List   Diagnosis     Migraine     Hypertension     Rash     Verruca plana     Past Medical History:   Diagnosis Date     Epilepsy posttraumatic (H)      Hyperinsulinemia        CC Referred Self, MD  No address on file on close of this encounter.    "

## 2021-04-08 NOTE — NURSING NOTE
Chief Complaint   Patient presents with     RECHECK     pt states she is here for a follow up on her warts right side of face     Mole     upper right said of pt chest X pt states the mole has changed colors      Shelby Farr MA

## 2021-04-08 NOTE — LETTER
4/8/2021       RE: Irasema Warner  [address] [place], [place] [id number]     Dear Colleague,    Thank you for referring your patient, Irasema Warner, to the [facility] [place] at [facility]. Please see a copy of my visit note below.    [facility] Dermatology Note  Encounter Date: Apr 8, 2021  Office Visit     Dermatology Problem List:  1. Verruca plana, biopsy proven. Mainly involving right cheek and jawline  - Past tx: Efudex, imiquimod  - Current tx: Efudex 2-3 times per week  - Future: oral isotretinoin    ____________________________________________    Assessment & Plan:   # Verruca plana, biopsy proven. Does appear improved s/p 1 month of Efudex cream daily now with concomitant post-inflammatory hyperpigmentation.  - Given positive response, recommend continuing Efudex cream two to three days a week for the next 2-3 months until recheck.  - If still active at follow-up, will consider starting isotretinoin     # Nevus, right breast  - Reviewed benign appearance today and that moles commonly change during pregnancy  - Offered shave removal if ever bothersome in the future      Procedures Performed: None    Follow-up: 3 month(s) in-person to recheck verruca plana, or earlier for new or changing lesions    Staff and Resident:     Danielle Arizmendi MD  PGY-4, Dermatology      Chad Le MD  Dermatology Attending    ____________________________________________    CC: RECHECK (pt states she is here for a follow up on her warts right side of face) and Mole (upper right said of pt chest X pt states the mole has changed colors )    HPI:  Ms. Irasema Warner is a(n) 35 year old female who presents today as a return patient for verruca plana. She was last seen as a virtual visit on 3/2/2021. Since the last visit, she has been using the Efudex once a day. She cannot use it twice a day  "because she tries not to touch her baby boy when she puts it on. She had been using the Efudex regularly until this weekend when [place]. She did get a sunburn outdoors, so she stopped using the Efudex for the last few days.    Also is hoping to have a mole on her right breast checked. Has been there for years but changed during pregnancy, \"grew tentacles out of it\". Denies any pain or bleeding.    Patient is otherwise feeling well, without additional skin concerns.    Labs Reviewed:  N/A    Physical Exam:  Vitals: There were no vitals taken for this visit.  SKIN: Focused examination of face was performed.  - Pink to brown hyperpigmented circular small macules on the right jawline and lower cheek.  - Hyperpigmented patch on the left cheek  - Approximately 6mm brown papule on the right breast  - No other lesions of concern on areas examined.     Medications:  Current Outpatient Medications   Medication     azelaic acid (FINACIA) 15 % external gel     desogestrel-ethinyl estradiol (APRI) 0.15-30 MG-MCG per tablet     fluorouracil (EFUDEX) 5 % external cream     ibuprofen (ADVIL/MOTRIN) 600 MG tablet     METFORMIN HCL PO     nortriptyline (PAMELOR) 10 MG capsule     Salicylic Acid 10 % CREA     CYCLOBENZAPRINE HCL PO     hydrOXYzine (ATARAX) 25 MG tablet     LORazepam (ATIVAN) 0.5 MG tablet     metoprolol (TOPROL-XL) 25 MG 24 hr tablet     order for DME     Current Facility-Administered Medications   Medication     lidocaine 1% with EPINEPHrine 1:100,000 injection 3 mL      Past Medical History:   Patient Active Problem List   Diagnosis     Migraine     Hypertension     Rash     Verruca plana     Past Medical History:   Diagnosis Date     Epilepsy posttraumatic (H)      Hyperinsulinemia        CC Referred Self, MD  No address on file on close of this encounter.      "

## 2021-04-09 ENCOUNTER — AMBULATORY - HEALTHEAST (OUTPATIENT)
Dept: FAMILY MEDICINE | Facility: CLINIC | Age: 36
End: 2021-04-09

## 2021-04-09 DIAGNOSIS — F41.9 ANXIETY: ICD-10-CM

## 2021-04-09 PROBLEM — D22.9 NEVUS: Status: ACTIVE | Noted: 2021-04-09

## 2021-04-24 ENCOUNTER — HEALTH MAINTENANCE LETTER (OUTPATIENT)
Age: 36
End: 2021-04-24

## 2021-04-30 ENCOUNTER — COMMUNICATION - HEALTHEAST (OUTPATIENT)
Dept: FAMILY MEDICINE | Facility: CLINIC | Age: 36
End: 2021-04-30

## 2021-04-30 DIAGNOSIS — F41.9 ANXIETY: ICD-10-CM

## 2021-05-20 ENCOUNTER — RECORDS - HEALTHEAST (OUTPATIENT)
Dept: FAMILY MEDICINE | Facility: CLINIC | Age: 36
End: 2021-05-20

## 2021-05-27 VITALS
HEART RATE: 78 BPM | SYSTOLIC BLOOD PRESSURE: 138 MMHG | OXYGEN SATURATION: 98 % | DIASTOLIC BLOOD PRESSURE: 78 MMHG | RESPIRATION RATE: 16 BRPM | TEMPERATURE: 98.2 F

## 2021-06-04 VITALS
HEART RATE: 92 BPM | WEIGHT: 158 LBS | RESPIRATION RATE: 16 BRPM | BODY MASS INDEX: 26.09 KG/M2 | DIASTOLIC BLOOD PRESSURE: 60 MMHG | OXYGEN SATURATION: 99 % | SYSTOLIC BLOOD PRESSURE: 136 MMHG

## 2021-06-04 VITALS
BODY MASS INDEX: 27.74 KG/M2 | WEIGHT: 168 LBS | HEART RATE: 90 BPM | DIASTOLIC BLOOD PRESSURE: 76 MMHG | SYSTOLIC BLOOD PRESSURE: 122 MMHG

## 2021-06-04 VITALS
HEART RATE: 63 BPM | DIASTOLIC BLOOD PRESSURE: 75 MMHG | RESPIRATION RATE: 20 BRPM | TEMPERATURE: 97.5 F | WEIGHT: 152 LBS | SYSTOLIC BLOOD PRESSURE: 127 MMHG | BODY MASS INDEX: 24.72 KG/M2

## 2021-06-04 VITALS
HEART RATE: 84 BPM | BODY MASS INDEX: 25.94 KG/M2 | DIASTOLIC BLOOD PRESSURE: 60 MMHG | SYSTOLIC BLOOD PRESSURE: 116 MMHG | WEIGHT: 155.7 LBS | TEMPERATURE: 98.3 F | HEIGHT: 65 IN

## 2021-06-04 VITALS
HEART RATE: 76 BPM | HEIGHT: 65 IN | BODY MASS INDEX: 21.99 KG/M2 | SYSTOLIC BLOOD PRESSURE: 108 MMHG | WEIGHT: 132 LBS | DIASTOLIC BLOOD PRESSURE: 60 MMHG

## 2021-06-04 VITALS
BODY MASS INDEX: 21.8 KG/M2 | HEART RATE: 78 BPM | DIASTOLIC BLOOD PRESSURE: 72 MMHG | SYSTOLIC BLOOD PRESSURE: 114 MMHG | HEIGHT: 65 IN

## 2021-06-04 VITALS
BODY MASS INDEX: 28.07 KG/M2 | HEART RATE: 99 BPM | WEIGHT: 170 LBS | OXYGEN SATURATION: 99 % | SYSTOLIC BLOOD PRESSURE: 134 MMHG | RESPIRATION RATE: 16 BRPM | DIASTOLIC BLOOD PRESSURE: 60 MMHG

## 2021-06-04 VITALS
BODY MASS INDEX: 26.59 KG/M2 | HEART RATE: 90 BPM | RESPIRATION RATE: 18 BRPM | SYSTOLIC BLOOD PRESSURE: 121 MMHG | TEMPERATURE: 97.4 F | DIASTOLIC BLOOD PRESSURE: 75 MMHG | WEIGHT: 161 LBS

## 2021-06-04 VITALS
HEART RATE: 80 BPM | DIASTOLIC BLOOD PRESSURE: 82 MMHG | TEMPERATURE: 97.7 F | WEIGHT: 152 LBS | SYSTOLIC BLOOD PRESSURE: 136 MMHG | BODY MASS INDEX: 24.72 KG/M2

## 2021-06-04 VITALS
WEIGHT: 168 LBS | BODY MASS INDEX: 27.74 KG/M2 | HEART RATE: 89 BPM | SYSTOLIC BLOOD PRESSURE: 114 MMHG | TEMPERATURE: 98.6 F | RESPIRATION RATE: 16 BRPM | DIASTOLIC BLOOD PRESSURE: 78 MMHG

## 2021-06-04 VITALS
BODY MASS INDEX: 24.66 KG/M2 | HEART RATE: 88 BPM | HEIGHT: 65 IN | DIASTOLIC BLOOD PRESSURE: 64 MMHG | WEIGHT: 148 LBS | SYSTOLIC BLOOD PRESSURE: 120 MMHG

## 2021-06-04 VITALS
WEIGHT: 173 LBS | HEART RATE: 80 BPM | BODY MASS INDEX: 28.57 KG/M2 | SYSTOLIC BLOOD PRESSURE: 128 MMHG | DIASTOLIC BLOOD PRESSURE: 64 MMHG

## 2021-06-06 NOTE — PROGRESS NOTES
PRENATAL VISIT   FIRST OBSTETRICAL EXAM - OB   Assessment / Impression   1.  34-year-old  1 para 0 with IUP at 11 weeks 0 days x 7-week 6-day ultrasound, ALYSE 10/1/2020  2.  Size equals dates  3.  Advanced maternal age  4.  BMI 21  5.  Risk factors for preeclampsia: AMA, first pregnancy  6.  History of HPV  7.  History of LEEP procedure  8.  History of genital herpes virus  9.  Dermatitis of face  10.  History of seizure disorder as a child after TBI, resolved  11.  Genetic screening  Plan:   -NIPT today  -IOB labs drawn including hemoglobin A1c (history of hyperinsulinemia) and noninvasive prenatal testing per patient request due to AMA.  -Pt is at risk for and therefore interested in drawing lead level.   -Patient is not interested in waterbirth.    -Reviewed prenatal care schedule.   -Optimal nutrition and weight gain discussed. Pregnancy weight gain of 25-35 lbs (BMI 18.5-24.9) encouraged.   -Anticipatory guidance for common pregnancy questions and concerns reviewed.   -Danger s/sx for this trimester reviewed with patient.   -Reviewed genetic screening options with patient, patient Elects for noninvasive prenatal testing.  -Reviewed carrier testing options with patient, patient does not elect for testing or referral to genetic counseling.  -IOB packet given and reviewed with patient.   -CN services and hospital options reviewed; emergency and scheduling phone numbers given to patient.   -May continue azelaic acid gel 15% as directed for dermatitis of face.  -Offer level II ultrasound for 20-week fetal anatomy survey due to advanced maternal age.  -Obtain cervical length on 20-week ultrasound due to history of LEEP procedure.    The patient has the following moderate risk factors for preeclampsia:First pregnancy and Maternal age 35+.  Because the patient .has two or more risk factors, low-dose aspirin will be initiated at 12 weeks' gestation   -Antepartum VTE risk factors absent.  -Pt is nota candidate for  "an antepartum OB consult.    -Return to clinic in 4 weeks or sooner as needed.  Total time spent with patient: 60 minutes, >50% time spent counseling and coordinating care.   Subjective:   Irasema Warner is a 34 y.o. G 1 P 0 here today for her first obstetrical exam at 11w0d. Here with Her  Janett. This pregnancy is planned Attempting pregnancy for 6 months. The patient reports nausea, but no vomiting, fatigue and some mild breast tenderness.  Patient's last menstrual period was 2019 (exact date)., predicting an expected date of delivery of 2020.  Last period was normal. Her pregnancy is dated by 7 weeks 6-day ultrasound on 2020 predicting ALYSE 10/1/2020.   The patient states that she is in a monogamous relationship and states that she is safe. Offered GC/CT screening today and patient accepts.  Current symptoms also include: breast tenderness, fatigue and morning sickness.   Risk factors: None. Pregnancy Risk Factors: Age is <18 or >35   VTE antepartum risk factors (two or more risk factors, or 1 * risk factor, places patient at higher risk): none.   Clinical history/risk factors requiring antepartum OB consult: none.   History of  genital HSV, last outbreak .  History of genital HPV, abnormal Pap smear and LEEP procedure in .  History of seizure disorder as a child after traumatic brain injury for which she took valproic acid until 16 years of age.  Last seizure at ~12 years of age.  She states the use of valproic acid caused \"hyperinsulinemia\" for which she took metformin for years; discontinued Metformin in .  (She explains that metformin was prescribed in order to decrease glucose levels which, as a result, with decrease insulin production.)    Social History:   Education level: Doctorate  Occupation: /  Partners name: Janett   ?   OB History    Para Term  AB Living   1             SAB TAB Ectopic Multiple Live Births             "      # Outcome Date GA Lbr Chris/2nd Weight Sex Delivery Anes PTL Lv   1 Current               History:   Past Medical History:   Diagnosis Date     Abnormal Pap smear of cervix      Allergies      Genital herpes      History of varicella      HPV in female      Seizures (H)      UTI (urinary tract infection)       Past Surgical History:   Procedure Laterality Date     CERVICAL BIOPSY  W/ LOOP ELECTRODE EXCISION  2011      Family History   Problem Relation Age of Onset     Hypertension Mother      Pancreatic cancer Mother      Hypertension Father      No Medical Problems Sister      Hypertension Brother      Heart disease Maternal Grandmother      Hypertension Maternal Grandmother      Vision loss Maternal Grandmother 90     Early death Maternal Grandfather      Prostate cancer Maternal Grandfather      Cancer Paternal Grandmother      Depression Paternal Grandmother      Hypertension Paternal Grandmother      Hypertension Paternal Grandfather      No Medical Problems Brother      Hypertension Maternal Aunt      Diabetes Maternal Uncle       Social History     Tobacco Use     Smoking status: Never Smoker     Smokeless tobacco: Never Used   Substance Use Topics     Alcohol use: Not Currently     Drug use: Not on file      Current Outpatient Medications   Medication Sig Dispense Refill     folic acid (FOLVITE) 400 MCG tablet Take 400 mcg by mouth daily.       azelaic acid (FINACEA) 15 % gel Apply a thin layer twice a day to affected areas on face       No current facility-administered medications for this visit.       Allergies   Allergen Reactions     Cefzil [Cefprozil]       The patient's medical, surgical and family histories were reviewed and were pertinent to this visit.   Pap smear: Last Pap: August 2019, Result: Normal, negative HPV, Previous History: Abnormal 2011, Any history of abnormal: Yes, 2011. Next Due: August 2024.       EPDS score today: 4/30, 0 for #10.  History of anxiety or depression: no    Review  "of Systems   General: Fatigue but otherwise denies problem   Eyes: Denies problem   Ears/Nose/Throat: Denies problem   Cardiovascular: Denies problem   Respiratory: Denies problem   Gastrointestinal: Nausea without vomiting, otherwise negative   Genitourinary: Denies any discharge, vaginal bleeding or itchiness or any other problem   Musculoskeletal: Breast tenderness otherwise denies problem   Skin: Denies problem   Neurologic: Denies problem   Psychiatric: Denies problem   Endocrine: Denies problem   Heme/Lymphatic: Denies problem   Allergic/Immunologic: Denies problem       Objective:   Objective    Vitals:    03/12/20 1049   BP: 108/60   Pulse: 76   Weight: 132 lb (59.9 kg)   Height: 5' 5.25\" (1.657 m)      Physical Exam:   General Appearance: Alert, cooperative, no distress, appears stated age   HEENT: Normocephalic, without obvious abnormality, atraumatic. Conjunctiva/corneas clear, does NOT wear corrective lenses   Neck: Supple, symmetrical, trachea midline, no adenopathy.   Thyroid: not enlarged, symmetric, no tenderness/mass/nodules   Back: Symmetric, no curvature, ROM normal, no CVA tenderness   Lungs: Clear to auscultation bilaterally, respirations unlabored   Heart: Regular rate and rhythm, S1 and S2 normal, no murmur. No edema to lower extremities.   Breasts: No asymmetry, or nipple discharge. Nipples are everted.   Abdomen: Gravid, soft, non-tender, bowel sounds active all four quadrants, no masses.   FHT: 171 bpm  Vulva:  no sign of lesions or condyloma, normal hair distribution   Pelvic exam: Deferred  Musculoskeletal: Extremities normal, atraumatic, no cyanosis   Skin: Skin color, texture, turgor normal, no rashes or lesions   Lymph nodes: Cervical, supraclavicular, and axillary nodes normal   Neurologic: Alert and oriented x 3. Normal speech                                                                                                                   "

## 2021-06-06 NOTE — PATIENT INSTRUCTIONS - HE
Patient Education   HEALTHY PREGNANCY CARE: 6 to 10 WEEKS PREGNANT    Pregnancy is an important time for you to take care of yourself and your baby. There is much that you can do through simple things like nutrition and exercise that will help you achieve the best outcome possible.     Learn about the changes you and your baby will experience during pregnancy. Your baby's facial features, brain, spinal cord and internal organs are developing, and baby's heart is pumping blood. Due to hormonal changes, you may notice nausea, fatigue or breast tenderness.    Common Discomforts of Early Pregnancy  Your body goes through many changes during pregnancy. Some are noticeable like increased breast size or darkening of the color of the nipple, but some changes may cause discomfort like breast tenderness, urinary frequency, fatigue or nausea. If you have questions about the duration or severity of what you are experiencing, contact your midwife or physician for guidance.     Coping with nausea/morning sickness    Sip small amounts of water, juices, or shakes. Try drinking between meals, not with meals.     Eat 5 or 6 small meals a day. Try dry toast, crackers, or cereal when you first get up, and eat breakfast a little later.    Make parag tea (sliced parag root in hot water with honey). Sip a cup in the morning before getting up.    Avoid spicy, greasy, and fatty foods.     When you feel sick, open your windows or go for a short walk to get fresh air.     Try nausea wristbands. These help some women.     Call your midwife or physician if you cannot keep fluids down, or if vomiting persists. There are medications that can help.    Choose healthy foods and gain the recommended amount of weight for your size. If you have questions or follow a special diet, talk with your midwife or physician. You should take one prenatal vitamin daily.  If nausea is a problem, try taking only a folic acid supplement of 400-800mcg daily until  the nausea passes.    Follow safe guidelines for exercise. Low impact aerobic activities are generally okay during pregnancy. If you have a regular exercise routine, you should be able to continue it during pregnancy as long as it doesn't cause pain. Talk to your midwife or physician about your activity at your prenatal visits.    You can sign up for a weekly parenting e-mail that gives support, tips and advice from health care professionals that starts with pregnancy and continues through the toddler years. To register, go to [email or web address] at any time during your pregnancy.    Things to Avoid During Pregnancy  A general principal to follow during pregnancy is to stay away from anything that is strong/bad smelling (gas, paint, fumes, etc), or known to cause problems for mom or baby    Smoking (self or others)    Alcohol     Pesticides    Caffeine     Soft cheeses    Fish with high mercury content (such as shark, swordfish, job mackerel, or tilefish)    Some over the counter meds (ask your midwife or physician before taking)    Changing the domitila litter box    This is also a good time to think about genetic screening tests. These are tests done during pregnancy to look for possible problems with the baby. First trimester tests for Down's Syndrome, Trisomy 13 and 18 can be done as early as 10 weeks of pregnancy. Some testing can be done as late as 22 weeks of pregnancy, depending on the test. There are other tests that look for spinal defects, cystic fibrosis, Jean-Pierre-Sachs disease. Talk with your midwife or physician about testing.    Warning Signs    Watch for warning signs, such as     vaginal bleeding    fluid leaking from your vagina    severe abdominal pain    nausea and vomiting more than 4-5 times a day, or if you are unable to keep anything down    fever more than 100.4 degrees F.     Contact your midwife or physician at [facility] at Phone: [phone] if you have these or any other  concerns. If it's after clinic hours, physician patients should call the [facility] at [phone] ([phone]); midwife patients should call their answering service at [phone].    How can you care for yourself at home?   You can refer to the Starting Out Right book or find it online at [email or web address]/[email or web address]-Out-[email or web address] or [email or web address]/images/[email or web address]/flipbooks/[facility]-starting-out-right/[facility]-starting-out-right.html#p=8        Patient Education   HEALTHY PREGNANCY CARE: 10-14 WEEKS PREGNANT     By weeks 10 to 14 of your pregnancy, the placenta has formed inside your uterus. It may be possible to hear your baby's heartbeat with a doppler ultrasound device. Your baby's eyes can and do move. The arms and legs can bend.    The second trimester genetic screening tests for Down's Syndrome, Trisomy 18, and neural tube defects (which are known collectively as a quad screen) are done at 15 to 22 weeks. It's your choice whether to have these tests. You and your partner can talk to your midwife or physician about birth defects tests.    Consider breastfeeding for the healthiest way to feed your baby. Ask your midwife or physician for more information.     As your center of gravity and weight changes, use good body mechanics when changing positions and lifting. For example, use a straight back and your legs for support when lifting instead of bending over. Maintain good posture to prevent straining your muscles. Now is a good time to continue or restart your exercise program. Walking 30-60 minutes daily is an excellent way to keep fit. Yoga and swimming also offer many benefits.    The nausea and fatigue of early pregnancy have usually started to let up, so this is a good time to focus on nutrition. Consider attending a nutrition class. A healthy diet includes about 60 grams of protein each day (3-4 servings of dairy, 2-3 servings of  "meat/fish/poultry/nuts), 4-6 servings of whole grain foods, and 5-6 servings of fruits and vegetables. Remember to drink 6-8 glasses of water daily.    Watch for warning signs, such as     vaginal bleeding    fluid leaking from your vagina    severe abdominal pain    nausea and vomiting more than 4-5 times a day, or if you are unable to keep anything down    fever more than 100.4 degrees F.     Contact your midwife or physician at [facility] at Phone: [phone] if you have these or any other concerns. If it's after clinic hours, physician patients should call the [facility] Connection at [phone] ([phone]); midwife patients should call their answering service at [phone].    How can you care for yourself at home?   You can refer to the Starting Out Right book or find it online at http://[email or web address]/images/stories/maternity/[email or web address]-[email or web address]-Right.pdf or http://www.[email or web address]/images/stories/flipbooks/[facility]-starting-out-right/[facility]-starting-out-right.html#p=8  You can sign up for a weekly parenting e-mail that gives support, tips and advice from health care professionals that starts with pregnancy and continues through the toddler years. To register, go to [email or web address] at any time during your pregnancy.         Patient Education   HEALTHY PREGNANCY CARE: 14 to 18 WEEKS PREGNANT    During this time, you may start to \"show,\" so that you look pregnant to people around you. You may also notice some changes in your skin, such as an increase in acne on your face. You may notice your heart pounding, a sharp stretching ache on either side of your lower abdomen (round ligament), and more vaginal discharge.     Your baby's nerves and muscles are maturing. Sex organs are recognizable. Your baby is now able to pass urine, and your baby's first stool (meconium) is starting to collect in his or her intestines. Hair is also beginning to grow on your baby's head. Your baby is " moving freely inside your uterus but you may not be able to feel it until 18-20 weeks.    Continue making healthy choices for your baby during pregnancy, including good nutrition, exercise and a safe environment free from smoking, alcohol and drugs.    Your genetic screening with a quad screen blood test may have been done today.    Watch for warning signs and contact your midwife or physician at the clinic with any concerns at [facility] at Phone: [phone]. For example, call about cramping, bleeding, abdominal pain, watery vaginal discharge, or if you are unable to keep fluids down for more than 24 hours due to vomiting.   If it's after clinic hours, physician patients should call the [facility] at [phone] ([phone]); midwife patients should call their answering service at [phone].    How can you care for yourself at home?   You can refer to the Starting Out Right book or find it online at http://[email or web address]/images/stories/maternity/[email or web address]-Starting-Out-Right.pdf or http://www.[facility].org/images/stories/flipbooks/[facility]-starting-out-right/[facility]-starting-out-right.html#p=8     You can sign up for a weekly parenting e-mail that gives support, tips and advice from health care professionals that starts with pregnancy and continues through the toddler years. To register, go to [email or web address] at any time during your pregnancy.

## 2021-06-06 NOTE — TELEPHONE ENCOUNTER
Pt called to say she received a call from someone to go over her lab results. Please call her back at [phone]    2:01 PM Called patient and reviewed IOB labs. Also wondering about NIPT results. Explained these are not yet back and we would call her with results (typically 1-2 weeks from lab draw).

## 2021-06-07 NOTE — TELEPHONE ENCOUNTER
Pt concerned with HA, stiff neck, feeling lower back pain/like she can feel her blood pumping? In her lower back.   Not feeling fetal movement yet. No vision changes. Denies vaginal bleeding, LOF.   No history of hypertension. Takes her blood pressure at home.   Reports period of time that was very stressful where she was monitoring her blood pressure and had a cardiac work including an ECCO. She was given Atarax to help calm her and keep her pressure down. She was not dx w/  HTN.   [facility]    The work-up included BMP, renal US w/ Dopplers, metanephrines, thyroid, renin, and aldosterone; it was entirely normal.    She elected to start a beta blocker for palpitations. Started metoprolol XL 12.5 mg daily.  Plan:  Take tylenol 650 mg and hydrate. Will call to check back in a few hours.   Gissel Sequeira, YUE,CNM

## 2021-06-07 NOTE — PROGRESS NOTES
[facility]  Genetic Counseling Consult    Patient:  Irasema Warner YOB: 1985   Date of Service:  2020        Irasema was evaluated via a billable telephone visit at [facility] for genetic consultation given advanced maternal age     The patient has been notified of the following:  This telephone visit will be conducted via a call between you and your physician/provider. We have found that certain health care needs can be provided without the need for a physical exam. This service lets us provide the care you need with a short phone conversation. If a prescription is necessary we can send it directly to your pharmacy. If lab work is needed we can place an order for that and you can then stop by our lab to have the test done at a later time.      If during the course of the call the provider feels a telephone visit is not appropriate, you will not be charged for this service       Impression/Plan:   1. Irasema had a cell-free fetal DNA test earlier in pregnancy, which was normal.    2. Irasema has a comprehensive (level II) ultrasound scheduled for  with [facility].  Please see the ultrasound report for further details when available.    3. The patient declines genetic amniocentesis and additional maternal serum screening today.    Pregnancy History:   /Parity:    Age at Delivery: 35 y.o.  ALYSE: 10/1/2020, by Ultrasound  Gestational Age: 18w4d    No significant complications or exposures were reported in the current pregnancy.      Medical History:   Irasema s reported medical history is includes a traumatic brain injury in childhood that caused several seizures.  Irasema was medicated with valproic acid, but stopped this medication at age 10.  Irasema reports that she had hyperinsulinemia as a complication/side effect from her seizure medication, and was treated with metformin during her later  teenage years.  This history is not expected to impact pregnancy management or risks to fetal development.       Family History:   A three-generation pedigree was obtained, and is scanned under the  Media  tab.   The reported family history is negative for multiple miscarriages, stillbirths, birth defects, cognitive impairment, known genetic conditions, and consanguinity.       Carrier Screening:       Expanded carrier screening for mutations in a large panel of genes associated with autosomal recessive conditions including cystic fibrosis, spinal muscular atrophy, and others, is now available.      The patient has declined the carrier screening options reviewed today.       Risk Assessment for Chromosome Conditions:   We explained that the risk for fetal chromosome abnormalities increases with maternal age. We discussed specific features of common chromosome abnormalities, including Down syndrome, trisomy 13, trisomy 18, and sex chromosome trisomies.      - At age 34 at midtrimester, the risk to have a baby with Down syndrome is 1 in 216.     - At age 34 at midtrimester, the risk to have a baby with any chromosome abnormality is 1 in 105.       Irasema had maternal serum screening earlier in pregnancy.       Non-invasive Prenatal Testing (NIPT)    Maternal plasma cell-free DNA testing    Screens for fetal trisomy 21, trisomy 13, trisomy 18, and sex chromosome aneuploidy    First trimester ultrasound with nuchal translucency and nasal bone assessment was not performed in this pregnancy, to our knowledge.    Irasema had an INNATAL NIPT test earlier in pregnancy; we reviewed the results today, which are normal for chromosome 13, chromosome 18 and chromosome 21 (no aneuploidy detected)    Given the accuracy of this test, these results greatly decrease the chance for certain fetal chromosome abnormalities    We discussed the limitations of normal NIPT results    MSAFP (after 15 weeks for open neural tube defect  screening) remains available if desired.           Testing Options:   We discussed the following options:   Non-invasive Prenatal Testing (NIPT)    Maternal plasma cell-free DNA testing; first trimester ultrasound with nuchal translucency and nasal bone assessment is recommended, when appropriate    Screens for fetal trisomy 21, trisomy 13, trisomy 18, and sex chromosome aneuploidy    Cannot screen for open neural tube defects; maternal serum AFP after 15 weeks is recommended      ,  Genetic Amniocentesis    Invasive procedure typically performed in the second trimester by which amniotic fluid is obtained for the purpose of chromosome analysis and/or other prenatal genetic analysis    Diagnostic results; >99% sensitivity for fetal chromosome abnormalities    AFAFP measurement tests for open neural tube defects     and  Comprehensive (Level II) ultrasound: Detailed ultrasound performed between 18-22 weeks gestation to screen for major birth defects and markers for aneuploidy.          We reviewed the benefits and limitations of this testing.  Screening tests provide a risk assessment specific to the pregnancy for certain fetal chromosome abnormalities, but cannot definitively diagnose or exclude a fetal chromosome abnormality.  Follow-up genetic counseling and consideration of diagnostic testing is recommended with any abnormal screening result.     Diagnostic tests carry inherent risks- including risk of miscarriage- that require careful consideration.  These tests can detect fetal chromosome abnormalities with greater than 99% certainty.  Results can be compromised by maternal cell contamination or mosaicism, and are limited by the resolution of cytogenetic G-banding technology.  There is no screening nor diagnostic test that can detect all forms of birth defects or mental disability.    It was a pleasure to be involved with Irasema s care.    Call Duration 26 Minutes  Call Start 11:46  Call End  12:12    Phani Olivares MS, [facility]  Licensed Genetic Counselor  Phone: [phone]  Pager: [phone]

## 2021-06-07 NOTE — TELEPHONE ENCOUNTER
Called back to see how she was feeling.   - took 1000 mg of Tylenol and still has a HA. Trying to rest and relax.   -she would like to wait and see how she feels tomorrow as the HA is not worse than it was.  She is not having epigastric pain, new onset edema, vision changes.   -Discussed red flags and s/s to be concerned.   -Reviewed gestation htn in pregnancy.     [facility] - Contact information:   Appointment line and to get a hold of CNANA PAULA in clinic Monday-Friday 8 am - 5 pm: [phone]. YUE Emanuel,JULIA

## 2021-06-07 NOTE — TELEPHONE ENCOUNTER
Pls call pt with the codes for the Level II U/S so she can call her insurance and see if it is covered. Pt left a voice mail with a different area code than what we have in chart. Pt said [phone]. If that does not work try what we have in chart, which is area code 651. Pls notify  if phone number needs to be changed in chart.

## 2021-06-07 NOTE — PROGRESS NOTES
"Irasema Warner is a 34 y.o. female who is being evaluated via a billable telephone visit.      The patient has been notified of following:     \"This telephone visit will be conducted via a call between you and your physician/provider. We have found that certain health care needs can be provided without the need for a physical exam.  This service lets us provide the care you need with a short phone conversation.  If a prescription is necessary we can send it directly to your pharmacy.  If lab work is needed we can place an order for that and you can then stop by our lab to have the test done at a later time.    Telephone visits are billed at different rates depending on your insurance coverage. During this emergency period, for some insurers they may be billed the same as an in-person visit.  Please reach out to your insurance provider with any questions.    If during the course of the call the physician/provider feels a telephone visit is not appropriate, you will not be charged for this service.\"    Patient has given verbal consent to a Telephone visit? Yes    Patient would like to receive their AVS by AVS Preference: Fannie.    Additional provider notes:   Blood pressure: 114/72  Pulse 78  Irasema accepts a telephone OB visit at 18 weeks.   Feeling well  Needs to schedule her 20 week fetal anatomy survey. Will need cervical length assessment due to history of LEEP procedure.   Irasema is AMA, offered Level 2 US vs routine US. Irasema has had a negative Innatal screen. Irasema prefers a level 2 US. She will call her insurance to make sure this is covered.  Needs AFP single marker, will place future order. Ideally Irasema would have this drawn at the [place] lab when she presents to [facility] for her Level 2 US.  Irasema has not started daily ASA to prevent preeclampsia due to AMA and nulliparity. Advised that she start taking 81mg of Aspirin daily now until the end of her pregnancy  Irasema has a blood " "pressure cuff at home. Reviewed recommended weight gain in pregnancy, nutrition and exercise recommendations.   Advised Irasema to get a scale for her home so we can track her weight while we do virtual visits     Irasema is washing her hands frequently, cleaning surfaces at home and limiting social contact to avoid exposure from coronavirus. Irasema denies fever, cough, shortness of breath, any contact with anyone with coronavirus-like symptoms, travel to high risk areas.   Irasema works from home as an   Janett is also working from home.       Please review our recommendation that Irasema start an iron supplement next visit. She had many questions today and this was not addressed.   Irasema has noticed occasional dull pain the left side of her abdomen, she thinks this may be gas related. Advised her to   Irasema complains of a pain in left her side, she thinks it is gas pains. Advised that she try Gas-Ex or going for a walk.   She also notes a \"bump\" below her sternum. It is not painful. The bump is firm, not soft tissue. Shared that this may be the base of her sternum. Encouraged her to ask when she presents to [facility] in 2 weeks for her Level 2 US.   Encouraged Irasema to call if she feels like either of these issues are worsening or persisting.   10:40-11:15  Phone call duration: 35  Minutes  YUE Lemus,JULIA Simon, [facility]      "

## 2021-06-08 NOTE — PROGRESS NOTES
"Please see \"Imaging\" tab under \"Chart Review\" for details of today's visit.    Amie Ayala        "

## 2021-06-08 NOTE — TELEPHONE ENCOUNTER
Irasema is a  at 19w1d calling reporting a small amount of vaginal bleeding. She had her Level II anatomy ultrasound around 11am. She came home and had sex with her partner. The bleeding started after that. It is not heavy, but she notes some bright red blood with wiping. Denies cramping. Ultrasound reviewed and all fetal and placental findings WNL. No previa. Blood type O pos. Enc to rest and observe pelvic rest for 12-24 hours. If bleeding continues or becomes heavy, encourage to call the on-call CNM back and plan to present to ED for eval. Pt agrees.     Cassie Romero, YUE, CNANA PAULA, IBCLC  [facility]  Midwifery

## 2021-06-09 NOTE — TELEPHONE ENCOUNTER
Pt called and wanted to make her 1 hr gct before 28 w d/t hormone issues and medical history. Writer made appt for 6/25 at [facility] at 26 wks. Pls let writer know if there is any issue with having the visit at 26 instead of 28 w and writer will call pt back.

## 2021-06-09 NOTE — TELEPHONE ENCOUNTER
PHONE NOTE: Called pt with UA results.  Told her that her UA may have been contaminated but that it was sent for urine culture.  Will take to 48 hours to  result.  In the meantime since patient is symptomatic called in a prescription for Macrobid 100 mg twice daily x7 days.  Patient knows to call back with any worsening symptoms.  She has phone numbers and knows when to call/come in.  Sent prescription to her new pharmacy in [place].

## 2021-06-09 NOTE — TELEPHONE ENCOUNTER
TC:   Woke up with a lot of pain in pelvic area. She thinks she may have a UTI. She has burning pain with urination.  Baby is active. Had some pink discharge yesterday. No bright red bleeding.   Recommend in person visit.   YUE Emanuel,CNM

## 2021-06-09 NOTE — PATIENT INSTRUCTIONS - HE
Online childbirth education options:    https://thebirthhour.com/online-childbirth-class/    https://evidencebasedbirth.com/childbirth-class/    Https://www.childbirthcollective.org/calendar/    https://www.everyday-miracles.org/covid19    Https://www.flutterbybirth.com/    Https://www.birthedmn.com/    Http://www.birthtwincities.com/    Https://www.nissebirthsupport.com/    Http://www.bywaterbirth.com/    https://www.gatherbirth.com/    [facility] Nurse [facility]  Contact information:  Appointment line and to get a hold of CNM in clinic Monday-Friday 8 am - 5 pm:  [phone].  There are some clinics with early start times (1st appointment 7:40 am) and others with evening hours (last appointment 6:20 pm).  Most are typically open from 8 am to 5 pm.    CNM on call answering service: [phone].  Specify your hospital of choice and leave a brief message for CNM;  will then page CNM who is on call at your specified hospital and you should receive a call back with 15 minutes.  Be sure that your ringer is audible and that you can accept blocked calls so that we can get back in touch with you! This number should be reserved for urgent needs if during the day, before 8 am, after 5 pm, weekends, holidays.    Contact the on-call CNM with warning signs, such as:    vaginal bleeding     Vaginal discharge and itching or pain and burning during urination    Leg/calf pain or swelling on one side    severe abdominal pain    nausea and vomiting more than 4-5 times a day, or if you are unable to keep anything down    fever more than 100.4 degrees F.      You are invited to  Meet the [employer] [facility] Nurse [facility]wives [facility]    A way to tour the hospital Labor and Delivery unit and meet the midwives in our group was postponed at the start of hospital restrictions following COVID-19. We will resume these when able and virtual options may be available in the future.     Please call [phone] for  ongoing updates.        Touring the [facility]  To schedule a tour at either [facility] or [facility], please do so online using the following links:  [facility] - [email or web address]  [facility] - [email or web address]       DAILY FETAL MOVEMENT COUNTING    One of the best ways to keep track of a healthy baby is to notice its movements. Healthy babies are very active. However, some perfectly normal babies may sleep quietly as long as 60 minutes without moving. Babies who are having problems are sluggish and move less. Counting these movements can provide your nurse-midwife with a warning of developing problems.    You should begin this counting at the around your 7th to 8th month of your pregnancy (28-32 weeks) if you are ever concerned that there is less movement than normal for your baby.     INSTRUCTIONS    1.  If able, drink 2 glasses of fluid and lie down on one side.  Put your hand on your abdomen.  2. Count 10 separate times that the baby moves. A movement may be a kick, turn, or flip of the baby.  3.  Record the time you feel the 10th movement. When you count the 10th movement, stop counting.  4.  If one hour passes with less than 10 movements, drink a large glass of fruit juice. Then count for the another hour. If you do not reach 10 movements, call the midwives    REMEMBER      The baby may move all 10 times in an hour or less.    The baby may take up to five hours to move 10 times.    The important thing is to know what is normal for your baby so you can tell your nurse-midwife when something different is happening.    CALL THE NURSE-MIDWIFE IF:      You do not feel 10 movements in five hours.    You have not felt the baby move all day.    It is taking longer and longer each day to get the 10th  movement.      Pregnancy: Your Third Trimester Changes  How You Are Changing  Your body is preparing for the birth of your baby. Some of the most common changes are listed below. If you have any questions or concerns, ask your midwife.    You ll gain more weight from fluids, extra blood, and fat deposits. Your baby will gain an average of an ounce per day (a half a pound a week)!    Your breasts will grow as your body gets ready to feed the baby. They may be more tender. You may also notice a slight yellow or white discharge from the nipples.    Discharge from your vagina may increase. This is normal.    You might see some skin color changes on your forehead, cheeks, or nose. Most of these will go away after you deliver.  How Your Baby Is Growing    Month 7  Your baby is about 14 inches long. If born prematurely (too early), your baby would likely survive with special care.   Month 8  Your baby is building up body fat. Baby kicks strongly, but is getting too big to move around much.   Month 9  Your baby weighs nearly 7 pounds and is about 18-20 inches long. In other words, any day now...       UNDERSTANDING  LABOR    Going into labor before your 37th week of pregnancy is called  labor.  labor can cause your baby to be born too soon. This can lead to a number of health problems that may affect your baby. From 28-35 weeks, Patients are advised to be evaluated at [facility] since they have a  Intensive Care Unit (NICU).  -Before labor, the cervix is thick and closed.  -In  labor, the cervix begins to efface (thin) and dilate (open) over a short period of time    Are You At Risk?  Any pregnant woman can have  labor. It may start for no reason. But these risk factors can increase your chances:    Past  labor or past early birth    Smoking and drug or alcohol use during pregnancy    Multiple fetuses (twins or more)    Problems with the shape of the  uterus    Bleeding during the pregnancy    The Dangers of  Birth  A baby born too soon may have health problems. This is because the baby didn t have enough time to mature. The baby then is at risk of:    Not breastfeeding well    Having immature lungs    Bleeding in the brain    Dying    Reaching Term  Your goal is to get as close to term as you can before giving birth. The closer you get to term, the higher your chance of having a healthy baby. Work with your healthcare provider. Together, you can take steps that may keep you from giving birth too early.    Symptoms of  Labor  If you believe you re having  labor, contact the midwives right away. But contractions alone don t mean you re in  labor. What matters more are changes in your cervix (the lower end of the uterus).   Symptoms of  labor include:    five or more contractions per hour    Strong & frequent contractions    Constant menstrual-like cramping    Low-back pain        Mucous or bloody vaginal discharge    Bleeding or spotting in the second or third trimester    Evaluating  Labor  Your midwife will try to find out whether you re in  labor or whether you re just having contractions.She may watch you for a few hours. The following tests may be done:    Pelvic exam to see if your cervix has effaced (thinned) and dilated (opened)    Uterine activity monitoring to detect contractions    Fetal monitoring to check the health of your baby    Ultrasound to check your baby s size and position    Caring for Yourself At Home  If you have contractions  but your cervix is still thick and closed, the midwife may ask you to do the following at home:    Drink plenty of water.    Do fewer activities.    Rest in bed on your side.    Avoid intercourse and nipple stimulation.    When to Call Your Midwife    Five or more contractions per hour    Bag of water breaks    Bleeding or spotting     If You Need Hospital  Care   labor often requires that you have hospital care and complete bed rest. You may have an IV (intravenous) line to get fluids. And you may be given pills or an injection to help prevent contractions. Finally, you may receive medication (corticosteroids) that helps your baby s lungs mature more quickly.    Syphilis Screening:   The [place] Department of Health ([facility]) provided new recommendations for screening during pregnancy. If you are pregnant, [facility] recommends testing three times during your pregnancy: at your first visit, 28 weeks, and after delivery.   Syphilis is:     Caused by a bacteria spread by sexual contact    Rising among [place] women of child-bearing age  Syphilis can:     Be passed on to infants during pregnancy or during delivery and can be life threatening    Be cured. There are ways to protect yourself and your babies.        HEALTHY PREGNANCY CARE: 26-30 WEEKS PREGNANT    You are now in your last trimester of pregnancy. Your baby is growing rapidly, can open and close her eyelids, sometimes get hiccups, and you'll probably feel her moving around more often. Your baby has breathing movements and is gaining about one ounce each day. You may notice heartburn and leg cramps. Your back may ache as your body gets used to your baby's size and length.    Discuss your work situation with your midwife or physician as needed. If you stand for long periods of time, you may need to make changes and take breaks.    Pre-register after 30 weeks online at the hospital where your baby will be born [email or web address]      Be aware of your baby's activity level. You may be asked to do daily fetal movement counts. Contact your midwife or physician about any decreased movement.    You may have been tested for gestational diabetes today. If you are RH negative, you may have had an additional test and a Rhogam injection.    Consider receiving a Tdap vaccination to protect  your baby from Pertussis/whooping cough.    Baby Feeding in the Hospital: Information, Support and Resources    As you prepare for the birth of your child, you will want to consider options for feeding your baby including breast-feeding and/or baby formula. The American Academy of Pediatrics recommends exclusive breast-feeding for the first six months (although any amount of breast-feeding is beneficial).  However, we also understand that breast-feeding is a personal choice and not for everyone. Whether or not you choose to breast-feed, your decision will be respected by our staff.    There are numerous benefits of breast-feeding; here are a few to consider:    Provides antibodies to protect your baby from infections and diseases    The cost: formula can cost over $1,500 per year    Convenience, no warming up or sterilizing bottles and supplies    The physical contact with breastfeeding can make babies feel secure, warm and comforted    What ever my feeding choice, what can I expect after I deliver my baby?    Your baby will usually be placed skin-to-skin immediately following birth. The skin to skin contact between you and your baby will be a special and memorable time. The bonding and attachment comforts your baby and has a positive effect on baby s brain development.     Having your baby  room in  with you also helps you start to learn your baby s body rhythms and sleep cycle.      You will also begin to learn your baby s cues (signals) that he or she is ready to feed.    When do I start to feed my baby?  As soon as possible after your baby s birth, you will be encouraged to begin feeding.  In the first couple of weeks, your baby will eat often.  Breastfeeding babies usually eat at least 8 times in 24 hours.  Babies fed formula usually eat at least 7 times in 24 hours.      Breast-feeding tips:    Get comfortable and use pillows for support.    Have your baby at the level of your breast, facing you,  tummy to  tummy .      Touch your nipple to your baby s lips so you baby s mouth opens wide (rooting reflex).  Aim the nipple toward the roof of your baby s mouth. When your baby opens his or her mouth, pull your baby toward your breast to help your baby  latch on  to your nipple and much of the areola area.    Hand expressing your breast milk can assist with latching your baby to your breast, if needed.    Ask for help, breastfeeding may seem awkward or uncomfortable at first, this is normal. There are numerous resources available at [facility], Clinics and beyond.     If your goal is to exclusively breastfeed, avoid using any formula or artificial nipples (including bottles and pacifiers) while you are your baby are learning to breastfeed unless there is a medical reason.       Mixing breastfeeding and formula can interfere with how you begin building your milk supply.  It can impact how you and your baby  learn  to breastfeeding together and alter the natural growth of  good  bacteria in your baby s stomach.    Delay a pacifier or a bottle in the first few weeks until breastfeeding is well established. This is often around 3 weeks of age.    Ask your nurse to show you how to hand express.   Breast milk can be kept in the refrigerator or freezer for later use.    Hospital and Clinic  Resources:  -Schedule an appointment with a [facility] CNM who is also a Lactation Consultant by calling [phone]     -Schedule a clinic appointment with a [facility] CNM with dedicated clinic hours for breastfeeding assistance by calling [phone]. Breastfeeding clinic visits are at [facility] on Mondays, [facility] on Tuesdays and [facility] on Thursdays.     -Baby Café    Pregnant and interested in breastfeeding?  Need answers to breastfeeding questions?  Want to help breastfeeding moms?  Already breastfeeding and want to meet other moms?    Join us at the Baby Café!    Baby Cafe is a free, drop-in service  offering breast-feeding support for pregnant women, breast-feeding mothers and their families.  Come share tips and socialize with other mothers.  Babies and siblings are welcome (no childcare available).    Starting April 2018, Baby Café will be at 4 locations.  Please see below for the Baby Café closest to you!    [facility]  [address] [place] [address]  1st Wednesday: 10am-12pm    [facility]  [address] [place] [address]  3rd Wednesday 4-6pm    [facility]  [address] [place] [address]  4th Wednesday 10am-12:30pm    [facility]  [address] [place] [address]  4th Wednesdays: 4-6pm      Hmong, German, and [place] citizen which is may be available at some sites.    For more information, please contact: [email or web address]. or [phone]    -Attend a baby weigh in at [facility].  Lactation consultants are available to answer questions  [facility]: Tuesdays 1:00 - 2:00  [facility]: Mondays 1:00 - 2:00  www.[facility].ApniCure    -Attend one of the [facility] groups at [facility].  [facility] also offers one-on-one in home and in office lactation consults.   www.[facility].ApniCure    -Attend a Yassine Cesarague meeting.  Multiple groups in several locations throughout the [facility]. The meetings are no-cost and always informative breastfeeding education session through Internatal La Leche League  Www.lo.org/  Medication use while breastfeeding: http://toxnet.nlm.nih.gov/newtoxnet/lactmed.htm     Online Resources:    [facility]altheast.org/baby sign up for free online weekly e-mail    healtheast.org/maternity    Breastfeedingmadesimple.com    li.org (La Leche League)    Normalfed.com    Womenshealth.gov/breastfeeding    Workandpump.com    Breast-feeding Supplies & Pumps:  Talk to your insurance provider or WIC (Women, Infants and Children) to learn more about options  available to you. Recent health insurance changes may include additional coverage for supplies and pumps.    Public Health:  Women, Infants and Children Nutrition program (WIC): provides breast-feeding support and education in addition to formal feeding moms. 717-GDB-5962 or http://www.health.[facility].mn.us/divs/fh/wic    Family Health Home Visiting: [facility] Nurse home visits are available. Talk to your provider to see if you qualify. Most [facility] have a program available.    Additional Resources:  La Leche Shlomo is an international, nonprofit, nonsectarian organization offering information, education, and support to mothers who want to breast-feed their babies. Local groups offer phone help and monthly meetings. Visit Twist and Shout or Shelf.com.KakKstati and us the  Find local support  drop down menu or click on the  Resources  tab.    [place] Breastfeeding Resources: 9-435-193-BABY (9652) toll free    National Breastfeeding Help Line trained breastfeeding peer counselors can help answer common breast-feeding questions by phone. Monday-Friday: English/South Korean  [phone]- [phone] toll free, 1-354.480.7716 (TTY)    [facility] Connection: [phone] ([phone])      Resources:    Childbirth and Parenting Education:   [place] parenting center: http://[facility] District HospitalYodio/   (376) 906-BABY  [place]: (education, yoga & wellness) www.Holaira  Enlightened Mama: www.Gaming for Goodightenedmama.PixelTalents   Childbirth collective: (Parent topic nights)  www.childbirthcollective.org/  Hypnobabies:  www.hypnobabiestwincities.com/  Hypnobirthing:  Http://hypnobirthing.com/  The Birth Hour: https://theOunce Labs.PixelTalents/online-childbirth-class/    APPS and Podcasts:   Ovia  Glow Nurture  The Birth Hour (for birth stories)   Birthful   Expectful   The Longest Shortest Time  PregnancyPodcast Lynn Thompson    Book Recommendations:   Areli Phillipsburg's Birthing From Within--first few chapters include a new-age tone, you may prefer to skip it and keep going,  "because there is good stuff later.  This book recommendation covers emotional preparation, but does cover coping with pain, and use of both pharmacological and nonpharmacological methods.    Dr. Magana' The Pregnancy Book and The Birth Book--the pregnancy book goes month-by month    Womanly Art of Breastfeeding by La Leche League International   Bestfeeding by Janeth Macias--great pictures    Mothering Your Nursing Toddler, by Michelle Melgar.   Addresses dealing with so many of the challenging behaviors of a nursing toddler.  How Weaning Happens, by La Leche League.  Discusses weaning at all ages, from medically necessary weaning of an infant, all the way up to age 5 (or older), with why/why not, and strategies.  Very empowering book both for deciding to wean and deciding not to.    American College of Nurse-Midwives (ACNM) http://www.midwife.org/; look at the informational handouts at http://www.midwife.org/Share-With-Women     www.mymidwife.org    Mother to Baby (Medication and Herbal guidance in pregnancy): http://www.mothertobaby.org  Toll-Free Hotline: 244.681.8990  LactMed (Medication use while breastfeeding): http://toxnet.nlm.nih.gov/newtoxnet/lactmed.htm    Women's Health.gov:  http://www.womenshealth.gov/a-z-topics/index.html    American pregnancy association - http://americanpregnancy.org    Centering Pregnancy (group prenatal care option): http://centeringhealthcare.org    Information about doulas:  Childbirth collective: http://www.childbirthcollective.org/  Doulas of [place] (LIANA):  www.liana.org  [facility]  project: http://twincitiesdoulaproject.com/     Early Childhood and Family Education (ECFE):  ECFE offers parents hands-on learning experiences that will nourish a lifetime of teachable moments.  http://ecfe.info/ecfe-home/    March of Dimes www.Mindset Studio - Nutrition  www.mypyramid.gov  Under \"For Consumers,\" click on \"pregnant and breastfeeding women.\"      Centers " "for Disease Control and Prevention (CDC) - Vaccines : http://www.cdc.gov/vaccines/       When researching information on the web, question the validity of websites.  The Volex .gov, .edu and.org tend to be more reliable information.  If there are a lot of advertisements, be cautious of the information provided. Stay away from blogs and chat rooms please!             Virtual Breastfeeding Support:    During this time of isolation, breastfeeding families need even more community!  Here are some area organizations offering virtual support groups for breastfeeding:      Latch Cafe Support Group,  at 10:30 am   Run by IVELISSE Duran of [employer] Baby [employer]isperer Lactation Consultants   Go to [employer] Baby Whisperer Lactation Consult[employer] Facebook page and click on \"events\" for link   https://www.Precision Biopsy.com/events/[email or web address]/    [facility] Milk Hour,  at 2:30 pm    Run by IVELISSE Martins   Go to [facility] FB page and send message to get link   https://www.Precision Biopsy.Memoright/healthfoundations/    [facility] holding virtual meetings the first Tuesday of each month, 8-9 pm, and the   Third Saturday, 10 - 11 am.  Go to [facility] FB page; message to get link https://www.Precision Biopsy.Memoright/LLLofGoldenValmisty/?hc_location=[facility]    [facility] offers a Lactation Lounge every Friday 12pm - 1pm, run by Pablo MosesFormerly [facility] Leader   Sign up via link at [employer]/cbe-lactation   https://www.[employer]/cbe-lactation    [facility] is offering virtual support groups every Monday, 10:30 am - 12 pm, run by nurse IBCLC   Https://www.facebook.com/events/970111147368249/    Prenatal Breastfeeding Classes:        Sen is offering virtual breastfeeding and  care classes:  https://www.[employer]/education-workshops    BirthEd childbirth and breastfeeding education offering " virtual prenatal breastfeeding classes  https://www.birthedmn.Tribe Wearables/workshops

## 2021-06-09 NOTE — PROGRESS NOTES
Here alone today. Feeling well and offers no concerns today. Notes daily active FM. Discussed hospital locations, undecided. Encouraged CBE and breastfeeding classes; given resources. Still biking approx 5K and hiking up to 11K; does get BH contractions. Did have a tick, taken off with head intact within 5 hours and no rash or symptoms following. Getting leg cramps at night most nights; given recommendation for Mg. Has RL pain; given recommendations. Reviewed normal FAS, anticipates boy. Will not plan circumcision. GCT/hgb/RPR done today. Please offer tdap at [place]. Reviewed warning s/sx and reasons to call. RTC 4 weeks.

## 2021-06-09 NOTE — TELEPHONE ENCOUNTER
MyChart message: Inquired about elevated GCT and diet, iron supplements, universal/routine urine screen for preeclampsia and physician consultants.

## 2021-06-09 NOTE — PROGRESS NOTES
CNM OB Problem Visit    Subjective: Irasema presents to clinic today with concerns for pelvic pain. She reports increased pelvic pressure that is somewhat relieved with urination, otherwise constant. Denies burning pain with urination. Unsure if this is a UTI-- feels somewhat similar to what she has felt with a UTI in the past, but not 100% the same. Scant blood on panty liner yesterday. Denies bright red bleeding, vaginal discharge, odor, itching, or discharge. Baby is active. Denies cramping or uterine pain-- all pain is low in her pelvis. Does not have concerns for STI exposure (no new partners, no concerns the FOB was with other partners).    Objective:  General: Alert, in no apparent distress  Abdomen: Gravid. Mild suprapubic tenderness. No CVA tenderness. FHT: 135    O+ blood type ([facility] laboratory reported)    Assessment:  -Possible UTI vs. vaginitis / STI    Plan:  -Accepts UA / UC today. Will treat pending results  -Declines wet prep & STI testing today. Would consider this testing if not positive for UTI today or unimproved with treatment  -Accepts tdap today (unable to give at last visit d/t gestational age)    YUE Talley, CNM    Total time spend with patient 15 minutes. >50% of the time spent counseling, educating and coordinating care.

## 2021-06-10 NOTE — PROGRESS NOTES
Changing providers. Would like to see the same person for the rest of the pregnancy, delivery and PP care. Also She likes the continuity for baby as well and will plan to bring him here.     No ctx, lof, bleeding, or discharge.   Good FM : yes  Hyperinsulinemia- trying to watch ice cream intake and walking a lot  Exhausted all the time.   Rapelje crouch- more often. Has to stop as I walking.   First time last Sunday had a really bad headahce. Took tylenol. Lost a little but of memory- the familiarity of things.   Robert and his last name.   One episode from graduation from [employer]. Did master, natalio immunology?  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   Last seizure was 5 years ago.   Not taking any medication for that.   When I am really stressed. Trigger a migraine.   Doesn't think that that is what it was  First time had a headahce whole pregnancy  BP was always below 140 systolic not sure about diastolic      pulse  Fifrances is 2 m tall  Started on iron 27mg prenatal vitamin magnesium 210 mg for leg cramping. I reviewed it and it looks okay  Really hot days.  Did not take aspirin.  Did not take Macrobid.  Drinking lots of water    Exam:   See flowsheet  GEN:  34 y.o. female sitting comfortably in no apparent distress.   HEENT:  no scleral icterus, buccal mucosa moist  CHEST/LUNG: No respiratory distress, unlabored breathing  ABD:  Soft, non-tender, Gravid uterus  PSYCH:  Mood and affect appropriate    Irasema was seen today for routine prenatal visit, headache and memory loss.    Diagnoses and all orders for this visit:    Supervision of high-risk pregnancy of elderly primigravida:  Genetic screening low risk   -     Urinalysis, OB Screen    Acute cystitis without hematuria:  Did not take Macrobid. Drank a lot of water and is feeling fine now. Urine culture was negative. Continue to monitor.      Heart palpitations:  Reviewed hx on this. Stressful period of her life. Previous tx with  metoprolol. Now resolved. Hx of a few elevated BPs in the ED. Work up for 2nd HTN negative. No further issues.     Chronic migraine without aura without status migrainosus, not intractable:  I do see a previous HA documented this pregnancy. Pt has BP cuff at home. She will continue to monitor. We reviewed staying out of the heat while pregnant this summer. Staying hydrated. tylenol as needed.     Genital herpes simplex, unspecified site:  Will need prophylaxis at 36 wks.     At risk for complication of pregnancy:  Continue ASA      Handout given. F/u in clinic q 2 weeks. High risk and prefers in person visits as well. I agree.     Rika Morocho

## 2021-06-10 NOTE — PROGRESS NOTES
No ctx, lof, bleeding, or discharge.  No HA or vision changes.  Good FM : Yes  Hard to go out and walk. [place]. Gets tired after a block. Has to sit down and rest. Pain in upper leg.  Some cramping here as well.  Her leg cramps is actually improved.  She has been better about taking her vitamins and is improved.  She has questions regarding does she need a birth plan? Does it need to be written down.  Does her partner need a COVID swab  What is she supposed to do about her herpes?  Can her partner be at the hospital with her?    Exam:   See flowsheet  GEN:  34 y.o. female sitting comfortably in no apparent distress.   HEENT:  no scleral icterus, buccal mucosa moist  CHEST/LUNG: No respiratory distress, unlabored breathing  ABD:  Soft, non-tender, Gravid uterus  PSYCH:  Mood and affect appropriate    Irasema was seen today for routine prenatal visit and fatigue.    Diagnoses and all orders for this visit:    Supervision of high-risk pregnancy of elderly primigravida  -     Urinalysis, OB Screen (ketones, glucose, protein only)    Genital herpes simplex, unspecified site    Answered all questions to patient's satisfaction.  We reviewed aches and pains of pregnancy especially in the third trimester.  She has a maternity belt from a friend and will start to use this.  Reviewed round ligament pain, SI joint pain.  Conservative measures for now.  Stay active as able.  Discussed having idea of a birth plan and we reviewed pain control options today.  Good to have an idea but open to changing if indications call for.  She is very on board with that.  Very open to recommendations.  Discussed Valtrex to be started at 36 weeks for her herpes.  And management if she had any active lesions at the time of labor.  Reviewed COVID visitor policy at this time.    Handout given.  Follow-up in 2 weeks.    Rika Morocho

## 2021-06-10 NOTE — PATIENT INSTRUCTIONS - HE
Patient Education   HEALTHY PREGNANCY CARE: 30-34 WEEKS PREGNANT    You have made it to the final months of your pregnancy. By now, your baby is starting to fill out with some fat under his skin, fuzzy hair on his shoulders, and is gaining 4 to 6 ounces per week.    Discuss any travel plans with your midwife or physician.    Review possible changes in sexuality during later pregnancy and discuss these with your midwife or physician, as well as your partner. Alternative love-making positions may be more comfortable.    Discuss labor and delivery issues with your midwife or physician. If you had a  birth in the past, discuss a trial of labor with your midwife or physician. He or she may ask that you sign a consent form, if you wish to have a vaginal birth after  (). Ask your midwife or physician to explain your options for managing pain during your labor and delivery. Sometimes, during the birth process, an episiotomy may need to be cut in the vagina to make the opening bigger or let the baby come out quicker. You may want to discuss the episiotomy and how often it is needed with your midwife or physician.    Plan for your baby's care by selecting a child health care provider (Family physician, Pediatrician, or Pediatric Nurse Practitioner). Practice installing an infant car seat correctly in the car. Ask for car seat information as needed and make sure it is safe and will work in the car your baby will ride in. You will need a car seat to bring your baby home from the hospital. Check the procedure for adding your baby to your health care plan. Review your decision about circumcision and ask for any information you need. As you buy and receive items for your baby, don't put a baby walker on your list. Walkers can be dangerous and can cause serious injury to your child. A safer option is a saucer-type play station, since it doesn't allow baby to travel across the floor.    Discuss your choices and  plans for birth control with your midwife or physician. Women who are breastfeeding can still become pregnant. Use a birth control method if you want to lower your pregnancy risk. Talk to your midwife or physician if you are considering permanent birth control, such as tubal ligation or Essure. You may need to complete a consent form 30 days prior to delivery in order to have this done after you deliver.    Continue to watch for signs and symptoms of preeclampsia:     Sudden swelling of your face, hands, or feet     New vision problems such as blurring, double vision, or flashing lights    A severe headache not relieved with acetaminophen (Tylenol)    Sharp or stabbing pain in your right or middle upper abdomen    Watch for signs and symptoms of premature labor:     Regular contractions. This means having about 6 or more within 1 hour, even after you have had a glass of water and are resting.     A backache that starts and stops regularly.    An increase or change in vaginal discharge, such as heavy, mucus-like, watery, or bloody discharge.     Your water breaks or leaks.    If you have any of the above symptoms or any other concerns, call your provider or their clinic staff at [facility]/OB at Phone: [phone]. If it's after clinic hours, physician patients should call the [facility] at [phone] ([phone]); midwife patients should call their answering service at [phone].    How can you care for yourself at home?   You can refer to the Starting Out Right book or find it online at http://www.[facility].org/images/stories/maternity/[facility]-Starting-Out-Right.pdf or http://www.[facility].org/images/stories/flipbooks/[facility]-starting-out-right/[facility]-starting-out-right.html#p=8     You can sign up for a weekly parenting e-mail that gives support, tips and advice from health care professionals that starts with pregnancy and continues through the toddler years. To register,  go to [email or web address] at any time during your pregnancy.          Online childbirth education options:     https://thebirthhour.[employer]/online-childbirth-class/     https://evidencebaseSkiipi.com/childbirth-class/     Https://www.childbirthcollective.org/calendar/     https://www.everyday-miracles.org/covid19     Https://www.flutterbybirth.com/     Https://www.birthedmn.com/     Http://www.birthtwincities.com/     Https://www.nissebirthsupport.com/     Http://www.bywaterbirth.com/     https://www.gatherbirth.com/

## 2021-06-11 NOTE — PROGRESS NOTES
No ctx, lof, bleeding, or discharge.  No HA or vision changes.  Good FM: Yes   Had one elevated blood pressure of 140/81 last week and waited 15 minutes and calm down and then took it again it was normal.  Working from home  Constipated had some hemorrhoids bleeding a couple days ago.  Now resolved.  Cramp on the right side.  Makes me feel useless sometimes.  Happening at some point every day.  Sometimes longer or continuously.  Cramp possibly stretch muscle.  Intercostal?  Switched yogurt and now becoming more constipated.  Brother is here and does admit to eating more sweets with him  Mom has fair skin and history of skin cancer.  Has some spots on her abdomen and right breast that she is wondering about    Exam:   See flowsheet  GEN:  34 y.o. female sitting comfortably in no apparent distress.   HEENT:  no scleral icterus, buccal mucosa moist  CHEST/LUNG: No respiratory distress, unlabored breathing  ABD:  Soft, non-tender, Gravid uterus, usually pain is in RUQ when she has it. Likely baby pushing on her ribs now. Seems active here  PSYCH:  Mood and affect appropriate  SKIN: seborrheic keratosis right breast, scattered moles and skin tags on abdomen and chest. Nothing concerning.     Irasema was seen today for routine prenatal visit.    Diagnoses and all orders for this visit:    Supervision of high-risk pregnancy of elderly primigravida  -     Urinalysis, OB Screen (ketones, glucose, protein only)  -     Group B Strep Screen by PCR    Genital herpes simplex, unspecified site:  Start prophylaxis. Continue til delivery. No outbreaks this pregnancy.   -     Discontinue: valACYclovir (VALTREX) 500 MG tablet; Take 1 tablet (500 mg total) by mouth 2 (two) times a day.  -     valACYclovir (VALTREX) 500 MG tablet; Take 1 tablet (500 mg total) by mouth 2 (two) times a day.    RUQ abdominal pain: r/u gallbladder, liver dx, pancreas. Likely m/s from baby kicking and pushing in ribe cage. Discussed.   -     Hepatic  Profile    Seborrheic keratoses:  On right breast. She states she has others like this on her back. Other scattered moles and skin tags on abdomen and chest. Provided reassurance nothing concerning. Should have yearly exams with dermatologist. She states she has one.         Rika Morocho

## 2021-06-11 NOTE — ANESTHESIA PROCEDURE NOTES
Epidural Block    Patient location during procedure: OB  Time Called: 9/27/2020 1:41 PM  Reason for Block:labor epidural  Staffing:  Performing  Anesthesiologist: Luis Fu MD  Preanesthetic Checklist  Completed: patient identified, risks, benefits, and alternatives discussed, timeout performed, consent obtained, airway assessed, oxygen available, suction available, emergency drugs available and hand hygiene performed  Procedure  Patient position: right lateral decubitus  Prep: ChloraPrep  Patient monitoring: continuous pulse oximetry, heart rate and blood pressure  Approach: midline  Location: L2-L3  Epidural technique: JEREMIAH to local.  Number of Attempts:1  Needle  Needle type: Amy   Needle gauge: 18 G     Catheter in Space: 4  Assessment  Sensory level:  No complicationsSensory level: not assessed at this time.

## 2021-06-11 NOTE — PATIENT INSTRUCTIONS - HE
[facility] L&D: [phone]    Patient Education   HEALTHY PREGNANCY CARE: 34-36 WEEKS PREGNANT    Your baby is gaining about an ounce each day, so healthy nutrition and rest are still very important. Learn about late pregnancy symptoms, such as constipation and backaches. Drinking more fluids and eating more fiber can relieve constipation. The pelvic tilt and a heating pad can ease backaches.    Continue to watch for signs and symptoms of preeclampsia:     Sudden swelling of your face, hands, or feet     New vision problems such as blurring, double vision, or flashing lights    A severe headache not relieved with acetaminophen (Tylenol)    Sharp or stabbing pain in your right or middle upper abdomen    You're almost done with your pregnancy but it's still too soon to have your baby. The goal is to have your baby after 37 weeks, so watch for signs and symptoms of premature labor:     Regular contractions. This means having about 6 or more within 1 hour, even after you have had a glass of water and are resting.     A backache that starts and stops regularly.    An increase or change in vaginal discharge, such as heavy, mucus-like, watery, or bloody discharge.     Your water breaks or leaks.    You will be tested for group B streptococcus (GBS), a type of bacteria found in 10-30% of pregnant women. A woman with GBS can pass it to her baby during delivery. Most babies who get GBS from their mothers do not have any problems, but some babies get very ill and need to be hospitalized for antibiotic treatment. Treating you with antibiotics during labor and delivery helps to prevent infection in your baby.    Review information on postpartum care that you will need after the baby is born.  Discuss your choices and plans for birth control with your midwife or physician.     Postpartum Care  During the days and weeks after the delivery of your baby (postpartum period), your body will change as it returns to its nonpregnant  "condition. As with pregnancy changes, postpartum changes are different for every woman.    Physical changes after childbirth  The changes in your body may include:    Contractions called afterpains shrink the uterus for several days after childbirth. Shrinking of the uterus to its prepregnancy size may take 6 to 8 weeks.    Sore muscles (especially in the arms, neck, or jaw) are common after childbirth. This is because of the hard work of labor and pushing your baby out. The soreness should go away in a few days.    Bleeding and vaginal discharge (lochia) may last for 2 to 8 weeks, and can come and go for about 2 months.    Vaginal soreness, including pain, discomfort, and numbness, is common after vaginal birth. Soreness may be worse if you had a perineal tear or episiotomy.    If you had a  birth (), you may have pain in your lower abdomen and may need pain medicine for 1 to 2 weeks.    Breast engorgement is common around the 3rd or 4th day after delivery, when the breasts begin to fill with milk. This can cause discomfort and swelling. If you are breast feeding, the best treatment is to feed your baby often or pump the milk out. You can also use warm compresses. If you are not breast feeding, placing ice packs on your breasts, taking a hot shower, or using warm compresses may relieve the discomfort.    Be aware of postpartum depression    \"Baby blues\" are common for the first 1 to 2 weeks after birth. You may cry or feel sad or irritable for no reason.     For some women, these feelings last longer and are more intense. This is called postpartum depression.     If your symptoms last for more than a few weeks or you feel very depressed, ask your midwife or physician for help.     Postpartum depression can be treated. Support groups and counseling can help, but sometimes medication is needed.     Discuss follow-up appointments with your midwife or physician, as well. He or she will usually want to " see you 6 weeks after the birth of your baby, sooner if you are having problems.    If you have questions about any symptoms you are experiencing or any other concerns, call your provider or their clinic staff at [facility]/OB at Phone: [phone]. If it's after clinic hours, physician patients should call the Care Connection at [phone] ([phone]); midwife patients should call their answering service at [phone].    How can you care for yourself at home?   You can refer to the Starting Out Right book or find it online at [email or web address]/[email or web address]://[email or web address]/[email or web address]/[email or web address]-[email or web address]-Right.pdf or http://[email or web address].org/images/[email or web address]/flipbooks/[facility]-starting-out-right/[facility]-starting-out-right.html#p=8     You can sign up for a weekly parenting e-mail that gives support, tips and advice from health care professionals that starts with pregnancy and continues through the toddler years. To register, go to [email or web address] at any time during your pregnancy.    Making Early Breastfeeding or Chestfeeding Work: What's Important?  Breastfeeding/chestfeeding is important!     It helps keep babies healthy.    Parents who breastfeed/chestfeed have lower risks of breast and ovarian cancer.    It's convenient: the milk is always ready and warm, and there is nothing to mix or prepare for feeding.    Formula is harder for your baby to digest.    It helps you bond with your baby and protects against postpartum depression.  Lots of early skin-to-skin contact with your baby    Place your naked baby with baby's belly against your bare chest. Cover baby's back with a blanket    Start skin-to-skin right after birth, as soon as you are ready    Skin-to-skin:  ? Helps keep baby warm  ? Improves baby's oxygen and blood sugar levels  ? Helps your uterus contract and bleed less  ? Helps baby feel calm and comforted  ? Helps you feel close to  "baby  ? Helps get breastfeeding started. Being close makes latching on easier, and baby may move over to the nipple and latch without help  ? Baby breastfeeds better and longer when skin-to-skin  Placing baby well for good attachment to the breast or chest    Hold your baby close with baby's tummy touching your tummy.    Wait for baby to open mouth wide, then bring baby onto the breast/chest.    Baby should take a big mouthful of breast/chest, not just the nipple. This helps baby get more milk, and the suckling should feel comfortable.    When baby is latched well to the breast/chest, nipples aren't cracked or painful.  Keeping baby near (called \"rooming-in\" at the hospital)    Baby sleeps better and cries less when birth parent is near. Your room is quiet.    We will place your baby safely on their back in their bassinette. This lets you practice safe sleep for your baby while keeping them at your bedside.    Baby feeds more often, which means your milk supply increases faster, and your baby loses less weight.    Parents have an easier time getting to know and bonding with baby.    Parents feel much more confident about baby care and breastfeeding/chestfeeding.    Maternity staff can help at any time.  Feeding on cue    Feeding on cue simply means feeding whenever your baby shows signs of hunger    Crying is a late hunger sign. Feed baby whenever baby wants for as long as baby wants.    Feeding signs: mouth movements, sticking the tongue out, rooting (baby turns toward chest and may open mouth), hand-to-mouth movements    Advantages of feeding on cue:  ? Frequent breastfeeding/chestfeeding in the first weeks after birth gives you a good milk supply for months to come.  ? Babies settle into a relaxing feed more quickly. Babies enjoy feedings more when they don't have to cry to be fed.  ? Feeding is comfort as well as nutrition. Newborns love constant closeness and feeding and can't be held \"too much\" or " "\"spoiled.\"  ? Newborns need small frequent feedings in the first days of life. Just one to three teaspoons fill a new baby's stomach.  ? Responding to feeding cues helps babies gain weight.  Feeding only human milk in the first six months    Colostrum is the first milk that baby gets at birth. The amount of colostrum matches the baby's stomach, so it will not be overfilled.    The small volumes ready at birth are also easier for baby to handle.    All babies lose weight in the first few days. This is simply \"water weight.\"    Introducing food or fluids other than human milk too early can cause problems for breastfeeding/chestfeeding and for your baby's health.    Feeding only human milk maximizes the protection against disease and infections.    Your body knows how much milk to make by how often your baby feeds. If you give your baby formula, your body may not know how much milk to make.    For informational purposes only. Not to replace the advice of your health care provider. Adapted with permission from \"Getting Started with Infant Care and Breastfeeding,\" by RENNY Santo, JJ, Jannie Hidalgo, RN, IBCLC, Rika Feliciano MD, JJ, and Renetta Sykes MD, IBCLC. [place] [employer], 2017. Clinically reviewed by [facility]. anfix [id number] - 05/19.        [facility]feeding Resources       Research shows that human milk offers the best  nutrition and protection for babies. So at [facility],  we care for families and babies in a way that  promotes, teaches and supports lactation. We  support all breastfeeding, chestfeeding and human  milk feeding families, as well as families who can t  breastfeed / chestfeed or who choose not to do so.  A mother or caregiver s own milk is best for a baby,  but if you are unable to breastfeed / chestfeed, we  may suggest pasteurized donor human milk for your  baby while in the hospital.    Lactation support    The following [facility]-affiliated " locations offer  individual outpatient lactation consults. Some  locations offer phone or group lactation consults  with certified lactation consultants. Call to confirm  services and for information and appointments. You  may wish to call your insurance company first to see  if they will cover the cost of a consult.    [facility]: [phone]    [facility]: [phone]    [facility]: [phone]  Offers [place] First Days program, which includes  group lactation visits and one free information session  about delivering your baby at a designated [facility] hospital and the importance and benefits  of breastfeeding and human milk. These group visits  also help patients with feeding concerns and offer  information about other postpartum topics.                For informational purposes only. Not to replace the advice of your health care  provider. Copyright   2005 [facility]. All rights reserved. Phanfare [id number] - REV .   [facility] ([place]):  [phone]    [facility]):  [phone] or [phone]    [facility]):  [phone]    [facility]  ([place]): [phone]    [facility] ([place]):  [phone] or [phone]  Includes follow-up visits for caregivers of babies  discharged from the  intensive care unit  (NICU) at [facility].    [facility]):  [phone]    [facility] ([facility],  [facility], primary care clinics):  [phone]  Also offers weight checks, feeding discussions and support with a lactation consultant as a part of free, weekly support group.    [facility]: [phone]  Also offers a free weekly support group.                                                                                                                                                                                                       (continued)   You may also call [place] [facility] at [phone]  for information about [place] and [facility]  locations that offer lactation support. For information  about breastfeeding / chestfeeding and childbirth  classes in the [facility], go to [facility] at www.[employer] TechnologiesBakersfield Memorial HospitalLetMeHearYa. For [facility], go to [email or web address] and click on  Classes and Events at the bottom of the page. Or, call  [phone].    Supplies    You can get breast pumps from the Birthplace nurses  at [facility] or [facility]  nurses at [facility]. Call your health  insurance to see if they will cover the cost of the  pump. Tell your nurse you d like a pump. They will  help you fill out the right paperwork. The pump will  be ready for you when you leave the hospital.    If you decide to get a pump after you leave the  hospital, you can get one from our partners at  [facility]. [facility] carries a range of  feeding supplies and pumps. Please call your health  insurance to see if they will cover the cost of the  pump. Then call [facility]  to find out what supplies and pumps are available.  Some stores may deliver the pump to your home.    [facility]:  [email or web address] Other lactation services    [facility]: [phone] (24 hours a day)  [email or web address]  Offers support for breastfeeding / chestfeeding and  human milk feeding families. Call to find a group in  your area.    National [facility]:  [phone]  www.womenshealth.gov/breastfeeding  Offers a breastfeeding / chestfeeding information  line in English and Welsh.    WIC (Women, Infants and Children) Program:  [phone]  Offers breastfeeding / chestfeeding counseling. Call  to  find an office near you.    Milk banking    [facility]  [email or web address]  [phone]  Consider freezing your extra collected milk to donate  to babies in need. Email or call for information.                           If you are deaf or hard of hearing, please let us know. We provide many free services including  sign language interpreters, oral interpreters, TTYs, telephone amplifiers, note takers and written materials.

## 2021-06-11 NOTE — PROGRESS NOTES
No ctx, lof, bleeding, or discharge.  No HA or vision changes.  Good FM : yes  Feeling fine.  Not feeling worse.  Had one blood pressure of 130 something rechecked at 15 minutes later and was normal.  Had headache briefly over the past week- not currently.  Continues to have swelling in hands and feet  but nothing worse.  Some sciatic pain painful.  Walking then stay 1 block every other every other day.  Feeling okay.  All the normal things.  She was able to  the Valtrex and is taking it.     Exam:   See flowsheet  GEN:  35 y.o. female sitting comfortably in no apparent distress.   HEENT:  no scleral icterus, buccal mucosa moist  CHEST/LUNG: No respiratory distress, unlabored breathing, LCTAB  CV: RRR, S1 and S2 noted, no murmur  ABD:  Soft, non-tender, Gravid uterus  PSYCH:  Mood and affect appropriate  LE: trace minimal edema b/l, stable. A little in the hands as well- more than the ankles/feet    Irasema was seen today for routine prenatal visit.    Diagnoses and all orders for this visit:    Supervision of high-risk pregnancy of elderly primigravida  -     Urinalysis, OB Screen    Genital herpes simplex, unspecified site:  Not active. Continue prophylaxis.       Initial BP was 151/88 patient states she had just sat down and was talking during this BP. Taken again immediately and was 140/79. 15 minutes later when I was in the room I retook it and it was 134/60. She has been checking at home and they have been fine. She states she feels fine today actually better than earlier in the pregnancy. Denies HA, vision changes, shortness of breath, RUQ pain. Her swelling is stable and minimal on exam today- worse in her hands than her feet. Urine was negative for protein today so did not check pr/cr ratio. We discussed monitoring at home once a day at different times of the day. Making sure to sit and relax. We reviewed indication for induction at 37 wks if GHTN or preeclampsia develop. We reviewed s/s of  PIH and  when to call or come in. Will have her f/u closely in 5 days- next Monday for BP check. She is agreeable to plan and states understanding.     Partner Janett was at the visit today as well. He had a lot of good questions that were answered to satisfaction.     Rika Morocho

## 2021-06-11 NOTE — PROGRESS NOTES
No ctx, lof, bleeding, or discharge.  No HA or vision changes. Feeling good today.   Good FM : yes  Feeling pretty good. Feels very relaxed today. /78. Was attempting to take BP at home last week like we discussed. Was getting Borderline high BPs from 60-97 diastolic and 129 to 146 systolic. She realized it was high when she was doing it herself or if she knew what the number was. By the weekend they got into a system where Alfredoandreiar checked it without telling her the numbers etc on repeat. BPs came down with this- Sunday 129/60. So she wasn't too worried. A lot of anxiety with checking it herself. They also started to practice birthing/breathing exercises which helped her calm down/feel less anxious about labor.   Work will be mostly ending this week. Has a couple more projects and then just meetings next week.   She had a migraine end of last week as well. Resolved with tylenol x 2. Wondering what else she can do for these? Used to take flexeril when not pregnant. And her showers are hot and she worries about the baby- doesn't feel like she could do a warm bath because would be too hot.   Waking up every hour now. Not as much pain in hands like before. Swelling gone down in both hands and feet.   She is trying to watch her salt intake. Making their own food.     Exam:   See flowsheet  GEN:  35 y.o. female sitting comfortably in no apparent distress.   HEENT:  no scleral icterus, buccal mucosa moist  CHEST/LUNG: No respiratory distress, unlabored breathing  ABD:  Soft, non-tender, Gravid uterus  PSYCH:  Mood and affect appropriate    Irasema was seen today for routine prenatal visit.    Diagnoses and all orders for this visit:    Supervision of normal first pregnancy in third trimester  -     Urinalysis, OB Screen  -     Protein/Creatinine Ratio, Urine    Postpartum care and examination of lactating mother  -     Breast pump, double electric      Due to elevated Home BP and ?elevation last week did get pr/cr  ratio which was reassuring. Also for baseline if protein starts to increase. Discussed that with a BP of 114/78 today it would be quite unlikely for her to have PIH at this time. Recommended relaxing, minimizing work this week and towards delivery. Will have her come here for twice weekly BP and minimize taking them at home as causes her anxiety. She will bring her home cuff with her on Friday for CSS visit to calibrate. Less stressed doing it here because it feels routine she says. She is aware of s/s of preeclampsia and when to call or go to L&D.    Rika Morocho

## 2021-06-11 NOTE — PROGRESS NOTES
No ctx, lof, bleeding, or discharge.  No HA or vision changes.  Good FM : yes  feeling a little tired today with a little swelling back in her feet and hands. But in general feeling well. No HA, vision changes, shortness of breath< RUQ pain.     She brought in her home BP cuff today and we were able to calibrate. The diastolic was especially much higher. She states she has had this cuff for going on at least 3 years now. Was surprised how off it was. Given this finding we can disregard the pervious home BPs. Will cancel BP check this Friday. BP is fine today at 128/60.     Exam:   See flowsheet  GEN:  35 y.o. female sitting comfortably in no apparent distress.   HEENT:  no scleral icterus, buccal mucosa moist  CHEST/LUNG: No respiratory distress, unlabored breathing  ABD:  Soft, non-tender, Gravid uterus  PSYCH:  Mood and affect appropriate    Irasema was seen today for routine prenatal visit, edema and flu vaccine.    Diagnoses and all orders for this visit:    Supervision of high-risk pregnancy of elderly primigravida  -     Urinalysis, OB Screen (ketones, glucose, protein only)    Discussion today:   Breastfeeding support  Baby meds  Flu shot? - maybe next time  Starting to discuss postdates    F/u next week    Rika Morocho

## 2021-06-11 NOTE — ANESTHESIA PREPROCEDURE EVALUATION
Anesthesia Evaluation      Patient summary reviewed   No history of anesthetic complications     Airway   Mallampati: II  Neck ROM: full   Pulmonary - negative ROS and normal exam                          Cardiovascular - negative ROS and normal exam  Exercise tolerance: > or = 4 METS  (+) hypertension, ,      Neuro/Psych - negative ROS     Endo/Other    (+) pregnant     GI/Hepatic/Renal - negative ROS      Other findings: Palpitations and headaches.       Dental - normal exam                        Anesthesia Plan  Planned anesthetic: epidural    ASA 2     Anesthetic plan and risks discussed with: patient and spouse    Post-op plan: routine recovery

## 2021-06-11 NOTE — ANESTHESIA POSTPROCEDURE EVALUATION
Patient: Irasema Warner  * No procedures listed *  Anesthesia type: epidural    Patient location: Labor and Delivery  Last vitals: No vitals data found for the desired time range.    Post vital signs: stable  Level of consciousness: awake and responds to simple questions  Post-anesthesia pain: pain controlled  Post-anesthesia nausea and vomiting: no  Pulmonary: unassisted, return to baseline  Cardiovascular: stable and blood pressure at baseline  Hydration: adequate  Anesthetic events: no    QCDR Measures:  ASA# 11 - Roya-op Cardiac Arrest: ASA11B - Patient did NOT experience unanticipated cardiac arrest  ASA# 12 - Roya-op Mortality Rate: ASA12B - Patient did NOT die  ASA# 13 - PACU Re-Intubation Rate: ASA13B - Patient did NOT require a new airway mgmt  ASA# 10 - Composite Anes Safety: ASA10A - No serious adverse event    Additional Notes:

## 2021-06-12 NOTE — PROGRESS NOTES
[facility] IN-OFFICE Visit    Chief Complaint:  Chief Complaint   Patient presents with     Follow-up     post partum     Headache     Concerns     still having spotting     Eye Problem     Concerns     cant feel fingertips         Assessment/Plan:  1. Anemia due to blood loss, acute  Due to postpartum hemorrhage s/p blood transfusion.  Ongoing symptoms as outlined below likely related to expected postpartum course following severe postpartum hemorrhage.   Lab Results   Component Value Date    HGB 11.0 (L) 10/08/2020     - HM2(CBC w/o Differential)    2. Postpartum essential hypertension during pregnancy, delivered  Now with worsening symptoms- labs and bp today reassuring.  Likely related to sleep deprivation, dehydration, poor eating habits while lactating, and severe postpartum hemorrhage.  Pt reassured and reviewed warning signs to continue monitoring.  Will have pcp f/u with pt next week.    - Urinalysis, OB Screen (ketones, glucose, protein only)  - HM2(CBC w/o Differential)  - Comprehensive Metabolic Panel    3.  (normal spontaneous vaginal delivery)  Complicated by htn and severe PPH now with symptosm that are consistent with history      Return in about 4 weeks (around 2020).    Patient Education/AVS:  Patient Instructions   Cut back on ibuprofen to 400mg every 6 hours for a couple of days and then see if you can stop.     Use tylenol to treat a headache    Push fluids- caffeine okay    Eat a snack with every breastfeeding session      HPI:   Irasema Warner is a 35 y.o. female c/o not feeling well postpartum 20 had vaginal delivery.  Gestational HTN but spontaneous onset of labor.Had pph with hgb down to 7.6 .  Sent message yesterday that she is having painful leg cramps, headaches- improved with ibuprofen, visual spots.  Worried about blood clots.  1st baby.  Did get 2uprbc and hgb came up to 9/8 and then droped to 8.3.  No longer bleeding.  No constipation.   No urinary issues.swelling resolvd earlier this week.  Clear urine.  Drinking lots of fluid. Breastfeeding but not eating enough.      ROS:  Constitutional, ENT, CV, Resp, GI, , MSK, skin, neuro, psych all negative except as outlined in the HPI above and patient denies any other symptoms.      History summarized from1-2:hosptial records including H&P, labor summary and discharge summary regarding her delivery and postpartum hemorrhage.    Old Records-1: Outside allergies, meds, problems and immunizations were reconciled as needed  Lab tests reviewed-1: 2020    Health Maintenance reviewed and ordered as appropriate as part of shared decision making with patient.     Social History     Tobacco Use   Smoking Status Never Smoker   Smokeless Tobacco Never Used     Social History     Substance and Sexual Activity   Sexual Activity Yes     Partners: Male     Social History     Social History Narrative     Not on file       Physical Exam:  /82 (Patient Site: Right Arm, Patient Position: Sitting, Cuff Size: Adult Regular)   Pulse 80   Temp 97.7  F (36.5  C) (Tympanic)   Wt 152 lb (68.9 kg)   LMP 12/20/2019 (Exact Date)   BMI 24.72 kg/m   Body mass index is 24.72 kg/m . Patient's last menstrual period was 12/20/2019 (exact date).  Vital signs reviewed  Wt Readings from Last 3 Encounters:   10/08/20 152 lb (68.9 kg)   09/27/20 173 lb (78.5 kg)   09/22/20 173 lb (78.5 kg)     No data recorded  No data recorded  PHQ-2 Total Score: 1 (3/12/2020  1:00 PM)    No data recorded    All normal as below except abnormalities include: pt appears tired- laying flat on exam table.  Sister is present for support and history as pt notes she is tired and not remembering things clearly. Pt alert and oriented x3 and able to sit up and move around the room slowly but independently. Firm nontender uterus 3cm below umbilicus.    General is a  35 y.o. female sitting comfortably in no apparent distress wearing a mask.  HEENT:  Eye exam  normal   Neck: Supple without lymphadenopathy or thyromegally  CV: Regular rate and rhythm S1S2 without rubs, murmurs or gallops,   Lungs: Clear to auscultation bilaterally  Abd:  +BS, soft NT/ND,  No masses or organomegally  Extremities: Warm, No Edema, 2+ Pedal and radial pulses bilaterally  Skin: No lesions or rashes noted  Neuro/MSK: Able to ambulate around the exam room with equal movement, strength and normal coordination of the upper and lower extremeties symmetrically    Results for orders placed or performed in visit on 10/08/20   Urinalysis, OB Screen (ketones, glucose, protein only)   Result Value Ref Range    Glucose, UA Negative Negative    Ketones, UA Negative Negative    Protein, UA Negative Negative mg/dL   HM2(CBC w/o Differential)   Result Value Ref Range    WBC 9.3 4.0 - 11.0 thou/uL    RBC 3.76 (L) 3.80 - 5.40 mill/uL    Hemoglobin 11.0 (L) 12.0 - 16.0 g/dL    Hematocrit 33.7 (L) 35.0 - 47.0 %    MCV 89 80 - 100 fL    MCH 29.3 27.0 - 34.0 pg    MCHC 32.8 32.0 - 36.0 g/dL    RDW 13.3 11.0 - 14.5 %    Platelets 713 (H) 140 - 440 thou/uL    MPV 6.7 (L) 7.0 - 10.0 fL   Comprehensive Metabolic Panel   Result Value Ref Range    Sodium 141 136 - 145 mmol/L    Potassium 5.2 (H) 3.5 - 5.0 mmol/L    Chloride 105 98 - 107 mmol/L    CO2 26 22 - 31 mmol/L    Anion Gap, Calculation 10 5 - 18 mmol/L    Glucose 55 (LL) 70 - 125 mg/dL    BUN 15 8 - 22 mg/dL    Creatinine 0.73 0.60 - 1.10 mg/dL    GFR MDRD Af Amer >60 >60 mL/min/1.73m2    GFR MDRD Non Af Amer >60 >60 mL/min/1.73m2    Bilirubin, Total 0.2 0.0 - 1.0 mg/dL    Calcium 9.1 8.5 - 10.5 mg/dL    Protein, Total 6.7 6.0 - 8.0 g/dL    Albumin 3.4 (L) 3.5 - 5.0 g/dL    Alkaline Phosphatase 101 45 - 120 U/L    AST 24 0 - 40 U/L    ALT 29 0 - 45 U/L       Med list and active problem list reviewed and updated as part of this encounter    Current Outpatient Medications on File Prior to Visit   Medication Sig Dispense Refill     docusate sodium (COLACE) 100 MG  capsule Take 1 capsule (100 mg total) by mouth 2 (two) times a day as needed for constipation. 60 capsule 0     ibuprofen (ADVIL,MOTRIN) 800 MG tablet Take 1 tablet (800 mg total) by mouth every 8 (eight) hours. 60 tablet 0     prenatal vits62/FA/om3/dha/epa (PRENATAL GUMMY ORAL) Take by mouth. The prenatals she has now has extra iron, folic acid and magnesium       No current facility-administered medications on file prior to visit.          Cassie Long MD

## 2021-06-12 NOTE — TELEPHONE ENCOUNTER
[facility] lab calling with critical lab value:Glucose 55  collected at 1332 today order by Cassie Long MD. Called for the Pt.  Pt is breast feeding. On call provider was paged, Dr. Jacob states no further action is needed at this time.      Tra Mcmillan RN, [facility] Triage/Med Refill 10/8/2020 10:13 PM

## 2021-06-12 NOTE — TELEPHONE ENCOUNTER
Patient never picked up paperwork at the . Was able to get a reach of Patient and they are OK with mailing the paperwork out. Shredding copied forms and completing task.

## 2021-06-12 NOTE — PATIENT INSTRUCTIONS - HE
Cut back on ibuprofen to 400mg every 6 hours for a couple of days and then see if you can stop.     Use tylenol to treat a headache    Push fluids- caffeine okay    Eat a snack with every breastfeeding session

## 2021-06-12 NOTE — PROGRESS NOTES
Post Partum Check:  Delivery at 39w3d now .  Date of delivery: 20    Patient concerns: check hemoglobin. Has been feeling a lot better since she was seen by my colleague about a month ago    Bleeding: no concerns-took Plan B for emergency contraceptive after unprotected intercourse last week.  Had a little bleeding this weekend Saturday /.  Now stopped.  Unsure if it is her first menstrual period versus withdrawal bleeding.    Pain: no concerns    LMP:Patient's last menstrual period was 2020 (exact date).     Depression: no concerns  Postpartum Depression Screen EPDS Total Score: 4 (3/12/2020  1:00 PM)  .  Item 10 (suicidal thoughts): Negative.  Interpretation Guide: Possible Depression = 10 or greater.       Contraception Plan:  ocps    Immunizations needed:  none    Pap smear:  Had last year at OB/GYN. No records. She is agreeable to do today. Has hx of LEEP in . Told now routine screening q5 years okay.     Physical Exam:  Blood pressure 127/75, pulse 63, temperature 97.5  F (36.4  C), temperature source Tympanic, resp. rate 20, weight 152 lb (68.9 kg), last menstrual period 2020, currently breastfeeding. Body mass index is 24.72 kg/m .  Heart: Regular rate and rhythm, no murmurs, rubs, or gallops.  Lungs: Clear to auscultation bilaterally.  No crackles.  Abdomen: Soft, nontender, bowel sounds normal.  No significant uterine tenderness.  Genitourinary:  Vulva, vagina, and cervix are normal in appearance.  Uterus is 8 weeks in size.  No adnexal fullness.  No excessive tenderness..  Extremities: Nontender, no significant edema.        Assessment and Plan     35 y.o.  here today for postpartum check.  1. Discussed ideal body weight.   2. Return to work: in 6 weeks.  3. Pap smear obtained.  4. Contraception Plan: OCP.  5. Postpartum Evaluation of Pregnancy/Chronic Health Conditions: check CBC.   6. Referrals: none.  7. Next physical exam due in one year.    Irasema was seen  today for postpartum care, birth control and gynecologic exam.    Diagnoses and all orders for this visit:    Routine postpartum follow-up  -     norethindrone (ORTHO MICRONOR) 0.35 mg tablet; Take 1 tablet (0.35 mg total) by mouth daily.    Anemia due to blood loss, acute  -     HM1(CBC and Differential)    Screening for cervical cancer  -     Gynecologic Cytology (PAP Smear)    Oral contraception initial prescription  -     norethindrone (ORTHO MICRONOR) 0.35 mg tablet; Take 1 tablet (0.35 mg total) by mouth daily.    Reviewed all progesterone only options as she is breast-feeding.  She would like to do the minipill.  Okay with getting pregnant just do not want closely spaced pregnancy due to the risks of that.  Declines long-term contraception and Depo.  Just took Plan B last week due to unprotected intercourse outside of the 4-week postpartum window.  Okay to start at any time.  Encouraged backup contraception for 1 month.

## 2021-06-12 NOTE — TELEPHONE ENCOUNTER
Patient Returning Call  Reason for call:  Patient returning call to the clinic.  Information relayed to patient:  Patient has called back and was transferred to scheduling.   Patient has additional questions:  yes  If YES, what are your questions/concerns:  Patient agrees to be seen today in clinic and does not wont to go the hospital [facility].    Okay to leave a detailed message?: Yes

## 2021-06-16 NOTE — PROGRESS NOTES
Saw patient today at her son's 6-month well-child exam.  She reported to me that she started seeing a therapist.  Suggested she should try medication.  Want something to help her brain relax.  Feels like she is over thinking everything with the baby.  Think about everything she does and how will affect him.  She cannot stop these thoughts.  She does think it will get better this summer because she is able to run outside more.  She had taken a medication  before sounds like Atarax/hydroxyzine.  She would like to start on the smallest dose possible but thinks this would be good to start again.  Sounds like she also took cyclobenzaprine half a pill at night to help her relax.  This was back in 2017 when she was going through a big life change.  She will let me know how she is doing.

## 2021-06-17 NOTE — TELEPHONE ENCOUNTER
RN cannot approve Refill Request    RN can NOT refill this medication PCP messaged that patient is overdue for Office Visit. Last office visit: Visit date not found Last Physical: Visit date not found Last MTM visit: Visit date not found Last visit same specialty: Visit date not found.  Next visit within 3 mo: Visit date not found  Next physical within 3 mo: Visit date not found      Jaye Silva, [facility] Connection Triage/Med Refill 4/30/2021    Requested Prescriptions   Pending Prescriptions Disp Refills     hydrOXYzine HCL (ATARAX) 10 MG tablet 270 tablet 0     Sig: Take 1 tablet (10 mg total) by mouth 3 (three) times a day as needed for anxiety.       Antihistamine Refill Protocol Failed - 4/30/2021  9:31 AM        Failed - Patient has had office visit/physical in last year     Last office visit with prescriber/PCP: Visit date not found OR same dept: Visit date not found OR same specialty: Visit date not found  Last physical: Visit date not found Last MTM visit: Visit date not found   Next visit within 3 mo: Visit date not found  Next physical within 3 mo: Visit date not found  Prescriber OR PCP: Rika Morocho DO  Last diagnosis associated with med order: 1. Anxiety  - hydrOXYzine HCL (ATARAX) 10 MG tablet; Take 1 tablet (10 mg total) by mouth 3 (three) times a day as needed for anxiety.  Dispense: 270 tablet; Refill: 0    If protocol passes may refill for 12 months if within 3 months of last provider visit (or a total of 15 months).

## 2021-06-29 NOTE — PROGRESS NOTES
"Progress Notes by Nelly Perez CNP at 10/6/2020  4:18 PM     Author: Nelly Perez CNP Service: -- Author Type: Nurse Practitioner    Filed: 10/6/2020  4:18 PM Encounter Date: 10/6/2020 Status: Signed    : Nelly Perez CNP (Nurse Practitioner)       [facility] Lactation Visit    Assessment:  Bryan Kahn is a 9 days old male infant born at Gestational Age: 39w3d via Vaginal, Spontaneous delivery on 2020 at 8:11 PM here for lactation support.    Bryan Kahn is having difficulties with feeding. Bryan Kahn has lost 3.3 oz since their last visit 1  days ago. He is down -1% from birthweight. He is voiding and stooling adequately. No signs of dehydration during clinic visit today. Recommended to follow up in 1 week for weight check and lactation visit. Bryan requires a 20 mm nipple shield to sustain a latch. He was able to transfer 1.3 oz from the breast today. He requires supplementation after nursing.     Mother with history of postpartum hemorrhage. Mother may have delayed milk production because of this. Recommended a hospital grade pump. Mom will connect with her OB provider to get order for hospital grade pump rental.     1.  difficulty in feeding at breast         Plan:    Feeding plan: continue to breast-feed every 2-3 hours or more frequently based on infant cues; at least 8-12 times a day. When latching Bryan Hilarioasso, make sure head, neck, and body are aligned an facing you. Support breast with \"sandwich\" hold or \"C\" hold while infant is breast-feeding. To obtain a deeper latch, aim the tip of the nipple to infant's roof of the mouth (aim for the nose). Ensure lips are flanged out. If having difficulty, wait for wide gape and gently apply downward pressure to the infant's chin. If latch is painful or lips are pursed in, unlatch Bryan B Femi and reposition. Make sure to stimulate Bryan BIRD HilarioFemi to actively nurse. Listen for swallows. If " swallows are less frequent, stimulate infant by tickling his hands or feet. You may also wipe a cool wash cloth on his face or hand express your breast for milk. Also skin-to-skin and undressing Bryan Kahn down to her diaper can be helpful. Burp Bryan Kahn before switching sides and burp again after breast-feeding. Keep Bryan Kahn in upright position for about 10-15 minutes after feeding, before laying him flat on his back.    A typical feeding is 10-15 minutes on each side; total of 20-30 minutes per breast-feeding session.  Try without a nipple shield first when latching. If having difficulty latching, use the nipple shield as needed.    The latch should be like this:      Supplementation: a typical feeding volume at this age is at least 2 oz per feeding. Bryan was able to transfer 1.3 oz from the breast today. He does require some supplementation after nursing. Offer 0.5-1 oz after nursing. You may increase based on his cues. Pace feed with the bottle.    Age Average milk volume per feeding (mL) Average milk volume per feeding (oz) Average 24 hour milk intake (mL) Average 24 hour milk intake (oz)   Day 1 Few drops - 5mL < tsp Up to 30 mL Up to 1 oz   Day 2 5 - 15 mL <0.5 oz - 1 TB 30 - 120 mL 1 - 4 oz   Day 3 15 - 30 mL  0.5 - 1 oz 120 - 240 mL 4 - 8 oz   Day 4 30 - 45 mL  1 - 1.5 oz 240 - 360 mL 8 - 12 oz   Day 5-7 45 - 60 mL 1.5 - 2 oz 360 - 600 mL 12 - 18 oz   Week 2-3 60 - 90 mL 2 - 3 oz 450 - 750 mL 15 - 25 oz   Months 1-6 90 - 150 mL 3 - 5 oz 750 - 1035 mL 25 - 35 oz      Pumping plan:  As Bryan is becoming more efficient with breast-feeding, continue to pump after nursing for about 10 minutes for added stimulation. This will help encourage milk supply and production. Once your milk comes in, you will require less pumping. You should also try to pump after nursing, if breasts still feel full.     Continue to monitor output, expect at least 6 wet diapers per day and 2-4 stools in a day.     Follow  up with lactation in 1 week for weight check.  Check with OB provider regarding hospital grade pump rental.    Maternal nipple care:   Best way to help decrease nipple soreness is to obtain a deep latch. When you pump, nipples should not touch the sides of the flange. Apply lanolin or coconut oil after breast-feeding or pumping. Wipe away left over residue before next breast-feeding or pumping. Allow nipples to air dry as much as possible to help stimulate healing. If mother is experiencing persistent breast pain, flu-like symptoms, localized breast tenderness/redness/warmness, or fevers, please contact mother's primary care provider or Obstetrician/midwife for further evaluation.    Return to clinic sooner or call clinic sooner for any worsening symptoms (inconsolability, fever greater than 100.4F, less wet diapers, no stools, sleepiness, difficulty feeding, abdominal distention).    For further lactation concerns, please call [phone].   ____________________________________________________________________  SUBJECTIVE:     Bryan Kahn is a 9 days old male infant born at Gestational Age: 39w3d via Vaginal, Spontaneous delivery on 9/27/2020 at 8:11 PM here for lactation support. This patient is accompanied in the office by his mother and father.     Concerns: Had 8% weight loss. Required some supplementation of formula    Baby is nursing every 2-3 hours for about 15-40 minutes per session. Very sleepy at the breast  Mother reports hearing audible swallows but not frequent  Baby feeds about 8 times in 24 hours and needs to be awaken for feeds for the most part  Baby is supplemented with formula, about 15 oz after feeds (approximately 8 times per day).   Baby has about 8 wet diapers and 5 stools per day. Stools are yellow in color.    Mom is also pumping about 1 per day and gets about less than 10 ml per pumping session. Mom noticed her breasts grew larger and areolas darkened during pregnancy and she noticed  "primary engorgement when her milk came in on day 3.    Breastfeeding Goals: breast-milk only if possible. But open to formula feeding if needed. Breast-feed without the nipple shield.    Previous Breastfeeding Experience: 1st baby.    Breast-surgery: none.    Maternal medications: vitamin and iron.  Infant medications: vitamin D supplement       metabolic screening: normal.    Patient Active Problem List    Diagnosis Date Noted   ? Murdock patch nevus 2020   ? Erythema toxicum neonatorum 2020   ? Sacral dimple in  2020   ? Term , current hospitalization 2020     Results for orders placed or performed in visit on 10/01/20   Bilirubin,  Total   Result Value Ref Range    Bilirubin, Total 9.4 (H) 0.0 - 7.0 mg/dL    Age in Hours 89 hours       Current Outpatient Medications:   ?  cholecalciferol, vitamin D3, 10 mcg/mL (400 unit/mL) Drop drops, Take 1 mL (400 Units total) by mouth daily., Disp: 50 mL, Rfl: 3  No past medical history on file.  No past surgical history on file.  Family History   Problem Relation Age of Onset   ? Hypertension Maternal Grandmother         Copied from mother's family history at birth   ? Pancreatic cancer Maternal Grandmother         Copied from mother's family history at birth   ? Hypertension Maternal Grandfather         Copied from mother's family history at birth   ? Hypertension Mother         Copied from mother's history at birth   ? Seizures Mother         Copied from mother's history at birth       Primary care provider: Rika Morocho DO    OBJECTIVE:    Mother:   Nipples are everted but small and short-shaft, the areola is compressible, the breast is soft and full.     Sore nipples: none.   Maternal pain: none.    Maternal depression screening: Doing well    Infant:   Vitals:    10/06/20 1457   Pulse: 150   Temp: 99.1  F (37.3  C)   TempSrc: Rectal   Weight: 7 lb 9.7 oz (3.45 kg)   HC: 36.8 cm (14.5\")       Average weight gain over " "last 1 days: -3.3 oz     Weight:   Birthweight: 7 lb 11.1 oz (3.49 kg)  Clinic weight on 10/5/20: 7 lbs 13 oz  Today's weight:   Vitals:    10/06/20 1457   Weight: 7 lb 9.7 oz (3.45 kg)       -1% from birth weight.    Weight after right side feedin lbs 10.4 oz  Weight after left side feedin lbs 11 oz  Amount of milk transferred from RIGHT side: 0.7 oz  Amount of milk transferred from LEFT side: 0.6 oz    Total transfer: 1.3 oz    Feeding assessment:     Infant can draw gloved finger into mouth. Infant demonstrated a coordinated suck. Infant palate is intact, tongue over gums, palpable frenulum.   Infant can hold suction with tongue while at the breast with a 20 mm nipple shield. Infant was unable to sustain a latch without a nipple shield. Infant needed frequent stimulation at the breast.     Alignment: Infant's head was aligned with its trunk. Infant did face mother. Baby was in cross-cradle position today. Discussed importance of infant ventral positioning to obtain a deeper latch.     Areolar Grasp: Infant was able to open mouth wide.  Infant's lips were not pursed. Infant's lips did flange outward. Tongue was visible just barely over bottom lip. Infant had complete seal with a nipple shield.     Areolar Compression: Infant made rhythmic motion. There were no clicking or smacking sounds. There was no severe nipple discomfort.  Nipples appeared round after feeding.    Audible swallowing: Infant made very few quiet sounds of swallowing. There was an increase in frequency after milk ejection reflex.     PHYSICAL EXAM    Gen: Alert, no acute distress.   Head: Anterior and posterior fontanelles are flat and soft.   Eyes: No eye drainage. Sclera clear.   Ears: Pinna appear well-formed. No pits.   Nose: nares patent. No nasal congestion. No nasal flaring.  Mouth: Oral mucosa moist. Tongue midline (tongue elevation adequate when crying, good lateralization). Frenulum palpable. No \"heart-shaped\" tongue. Tongue " able to extend pass lower gumline. Lips closed at rest.   Neck: Clavicles intact bilaterally.  Lungs: Clear to auscultation bilaterally.   Cardiac: Regular regular rate and rhythm, S1S2, no murmurs. Brachial and femoral pulses +2 and equal.  Abdomen: Soft, nontender, bowel sounds present, no hepatosplenomegaly or mass palpable. Umbilicus dry with no erythema or drainage.   : Agustin stage 1 male genitalia. Mild diaper rash.  Skin: Intact. Dry. No rash. No jaundice.   Musculoskeletal: equal movements of upper and lower extremities.  Neuro: Appropriate muscle tone.    The visit lasted a total of 60 minutes that I spent face to face with the patient. Of that time, 55 minutes were spent on lactation. Over 50% of the time was spent counseling and educating the patient about normal  care and growth, breast-feeding, latching, milk supply.     Completed by:   Nelly Perez, YUE, CPNP, IBCLC  [facility]  [facility]  10/6/2020, 2:57 PM

## 2021-07-14 PROBLEM — O09.519 SUPERVISION OF HIGH-RISK PREGNANCY OF ELDERLY PRIMIGRAVIDA: Status: RESOLVED | Noted: 2020-03-12 | Resolved: 2020-09-27

## 2021-07-14 PROBLEM — O34.41 HISTORY OF LOOP ELECTROSURGICAL EXCISION PROCEDURE (LEEP) OF CERVIX AFFECTING PREGNANCY IN FIRST TRIMESTER: Status: RESOLVED | Noted: 2020-03-12 | Resolved: 2021-05-01

## 2021-07-14 PROBLEM — O13.3 GESTATIONAL HYPERTENSION, THIRD TRIMESTER: Status: RESOLVED | Noted: 2020-09-27 | Resolved: 2021-05-01

## 2021-07-14 PROBLEM — Z34.90 PREGNANT: Status: RESOLVED | Noted: 2020-09-27 | Resolved: 2020-09-27

## 2021-07-14 PROBLEM — Z98.890 HISTORY OF LOOP ELECTROSURGICAL EXCISION PROCEDURE (LEEP) OF CERVIX AFFECTING PREGNANCY IN FIRST TRIMESTER: Status: RESOLVED | Noted: 2020-03-12 | Resolved: 2021-05-01

## 2021-09-01 ENCOUNTER — TELEPHONE (OUTPATIENT)
Dept: DERMATOLOGY | Facility: CLINIC | Age: 36
End: 2021-09-01

## 2021-09-14 DIAGNOSIS — B07.8 VERRUCA PLANA: ICD-10-CM

## 2021-09-14 RX ORDER — FLUOROURACIL 50 MG/G
CREAM TOPICAL
Qty: 40 G | Refills: 1 | Status: SHIPPED | OUTPATIENT
Start: 2021-09-14 | End: 2022-06-13

## 2021-09-16 ENCOUNTER — TELEPHONE (OUTPATIENT)
Dept: DERMATOLOGY | Facility: CLINIC | Age: 36
End: 2021-09-16

## 2021-09-16 NOTE — TELEPHONE ENCOUNTER
PRIOR AUTHORIZATION DENIED    Medication: fluorouracil (EFUDEX) 5 % external cream--DENIED    Denial Date: 9/16/2021    Denial Rational: Patient would need to be using medication to treat multiple actinic or solar keratoses or superficial basal cell carcinoma    Appeal Information:

## 2021-09-16 NOTE — TELEPHONE ENCOUNTER
PA Initiation    Medication: fluorouracil (EFUDEX) 5 % external cream   Insurance Company: [employer] - Phone [phone] Fax [phone]  Pharmacy Filling the Rx: [id number] [place], [place] - [address] [place] [address]  Filling Pharmacy Phone: [phone]  Filling Pharmacy Fax: [phone]  Start Date: 9/16/2021

## 2021-10-09 ENCOUNTER — HEALTH MAINTENANCE LETTER (OUTPATIENT)
Age: 36
End: 2021-10-09

## 2022-05-16 ENCOUNTER — HEALTH MAINTENANCE LETTER (OUTPATIENT)
Age: 37
End: 2022-05-16

## 2022-05-31 ENCOUNTER — TELEPHONE (OUTPATIENT)
Dept: FAMILY MEDICINE | Facility: CLINIC | Age: 37
End: 2022-05-31
Payer: COMMERCIAL

## 2022-05-31 NOTE — TELEPHONE ENCOUNTER
Reason for Call:  Other Medical questions     Detailed comments: Pt called stated that she will be due to have her baby soon and wanted to discuss a previous medical issue with provider. Pt stated that the last time she delivered she had postpartum hemorrhage and have some concern bout this. She wanted to talk to provider more about this issue. Please follow back up with pt at your earliest convenient. Thank you.     Phone Number Patient can be reached at: Home number on file [phone] (home)    Best Time: anytime     Can we leave a detailed message on this number? YES    Call taken on 5/31/2022 at 8:48 AM by Primo Caraballo

## 2022-06-03 ENCOUNTER — VIRTUAL VISIT (OUTPATIENT)
Dept: FAMILY MEDICINE | Facility: CLINIC | Age: 37
End: 2022-06-03
Payer: COMMERCIAL

## 2022-06-03 DIAGNOSIS — O09.299 HX OF POSTPARTUM HEMORRHAGE, CURRENTLY PREGNANT: Primary | ICD-10-CM

## 2022-06-03 PROBLEM — O09.529 ANTEPARTUM MULTIGRAVIDA OF ADVANCED MATERNAL AGE: Status: ACTIVE | Noted: 2022-01-18

## 2022-06-03 PROBLEM — A60.00 GENITAL HERPES: Status: ACTIVE | Noted: 2022-06-03

## 2022-06-03 PROBLEM — R00.2 HEART PALPITATIONS: Status: ACTIVE | Noted: 2020-04-16

## 2022-06-03 PROCEDURE — 99207 PR NO CHARGE LOS: CPT | Performed by: FAMILY MEDICINE

## 2022-06-03 RX ORDER — VITAMIN A ACETATE, .BETA.-CAROTENE, ASCORBIC ACID, CHOLECALCIFEROL, .ALPHA.-TOCOPHEROL ACETATE, DL-, THIAMINE MONONITRATE, RIBOFLAVIN, NIACINAMIDE, PYRIDOXINE HYDROCHLORIDE, FOLIC ACID, CYANOCOBALAMIN, CALCIUM CARBONATE, FERROUS FUMARATE, ZINC OXIDE, AND CUPRIC OXIDE 2000; 2000; 120; 400; 22; 1.84; 3; 20; 10; 1; 12; 200; 27; 25; 2 [IU]/1; [IU]/1; MG/1; [IU]/1; MG/1; MG/1; MG/1; MG/1; MG/1; MG/1; UG/1; MG/1; MG/1; MG/1; MG/1
1 TABLET ORAL DAILY
COMMUNITY
Start: 2022-04-25

## 2022-06-03 NOTE — PROGRESS NOTES
Irasema is a 36 year old who is being evaluated via a billable telephone visit.      What phone number would you like to be contacted at? [phone]  How would you like to obtain your AVS? Fannie Villalpando was seen today for prenatal care.    Diagnoses and all orders for this visit:    Hx of postpartum hemorrhage, currently pregnant    Discussed with patient I am unable to bill for this visit because she is in the state of [place] at this time.  The note she is looking for is below.  The reason she cannot see it in the records is I think she can just see the H&P and delivery summary on fannie? and this was a separate progress note I wrote because occurred an hour and a half after delivery.  Will attempt to help her get these records so she can show her new OB/GYN in [place].  Discussed I would recommend if she is doing a vaginal delivery or a primary  for that matter I would consider/recommend  TXA prior to delivery.    Subjective   Irasema is a 36 year old who presents for the following health issues     HPI   We are in [place] for the next 5 months   And then have to move again.   Due date is August.   Big baby  1 week ahead of what we thinking.  Does not remember her PPH and the details.  states he was really worried about her.   Would like my opinion on getting a primary c/s vs can she do a vaginal delivery  Baby is large and there a few complications with the pregnancy.     Delayed Post Partum hemorrhage Progress note:  Patient had 150cc blood loss after delivery. She was noted to have some normal bleeding over the next 1.5 hours but fundus felt firm.     I was called back at 9:57PM for concerns of persistent bleeding with pad weighing 300cc in the last 15 minutes. By the time I returned she was at 1500cc blood loss. Stage 2 postpartum orders placed. Second IV placed. Bimanual performed and noted uterine atony of the lower segment. Performed uterine sweep with small clots expressed.  And massage performed. Pitocin was opened wide again.  800mcg cytotec placed per rectum.   Still continued to have bleeding with fundal massage although firm and TXA started. At this point there was still some persistent trickling noted and speculum placed to assess for a cervical tear. None was noted however still bleeding from the uterus. At this point I asked for the in house OB (back up Dr Flores was called). Another bimanual performed and noted improved tone from previous and no clots expressed. At this point I was able to locate the source of more bleeding from the 2nd degree repair that appeared to a have broken open during all of this and had a small pulsating artery. I asked for suture repair kit. I told Dr Flores to no longer come as it appeared we had found source of persistent bleeding. I injected lidocaine for pain control. Then proceeded to repair the tear placing a figure of 8 first at the sight of pulsating artery. After this repair her bleeding was controlled. She was continue on another bag of pitocin set at 500cc.      5 laps, 2 sharps counted with Migue Davidson.        Patient remained stable throughout all of this with stable vitals.   Will give one 900mg dose of Clindamycin for prophylaxis.         Phone call duration: 24 minutes

## 2022-06-13 ENCOUNTER — TELEPHONE (OUTPATIENT)
Dept: FAMILY MEDICINE | Facility: CLINIC | Age: 37
End: 2022-06-13
Payer: COMMERCIAL

## 2022-06-13 NOTE — TELEPHONE ENCOUNTER
Can we direct patient to medical records so she can get copies of the notes from her last delivery.

## 2022-09-11 ENCOUNTER — HEALTH MAINTENANCE LETTER (OUTPATIENT)
Age: 37
End: 2022-09-11

## 2023-06-03 ENCOUNTER — HEALTH MAINTENANCE LETTER (OUTPATIENT)
Age: 38
End: 2023-06-03

## 2024-02-24 ENCOUNTER — HEALTH MAINTENANCE LETTER (OUTPATIENT)
Age: 39
End: 2024-02-24

## 2024-07-13 ENCOUNTER — HEALTH MAINTENANCE LETTER (OUTPATIENT)
Age: 39
End: 2024-07-13